# Patient Record
Sex: FEMALE | Race: WHITE | Employment: PART TIME | URBAN - NONMETROPOLITAN AREA
[De-identification: names, ages, dates, MRNs, and addresses within clinical notes are randomized per-mention and may not be internally consistent; named-entity substitution may affect disease eponyms.]

---

## 2021-12-23 ENCOUNTER — OFFICE VISIT (OUTPATIENT)
Dept: PRIMARY CARE CLINIC | Age: 31
End: 2021-12-23
Payer: COMMERCIAL

## 2021-12-23 VITALS
BODY MASS INDEX: 22.08 KG/M2 | RESPIRATION RATE: 18 BRPM | HEIGHT: 62 IN | DIASTOLIC BLOOD PRESSURE: 70 MMHG | WEIGHT: 120 LBS | HEART RATE: 73 BPM | SYSTOLIC BLOOD PRESSURE: 102 MMHG | TEMPERATURE: 97.1 F | OXYGEN SATURATION: 99 %

## 2021-12-23 DIAGNOSIS — G43.911 INTRACTABLE MIGRAINE WITH STATUS MIGRAINOSUS, UNSPECIFIED MIGRAINE TYPE: Primary | ICD-10-CM

## 2021-12-23 PROCEDURE — G8484 FLU IMMUNIZE NO ADMIN: HCPCS | Performed by: FAMILY MEDICINE

## 2021-12-23 PROCEDURE — 99204 OFFICE O/P NEW MOD 45 MIN: CPT | Performed by: FAMILY MEDICINE

## 2021-12-23 PROCEDURE — 1036F TOBACCO NON-USER: CPT | Performed by: FAMILY MEDICINE

## 2021-12-23 PROCEDURE — G8427 DOCREV CUR MEDS BY ELIG CLIN: HCPCS | Performed by: FAMILY MEDICINE

## 2021-12-23 PROCEDURE — G8420 CALC BMI NORM PARAMETERS: HCPCS | Performed by: FAMILY MEDICINE

## 2021-12-23 RX ORDER — AMITRIPTYLINE HYDROCHLORIDE 25 MG/1
25 TABLET, FILM COATED ORAL NIGHTLY
Qty: 30 TABLET | Refills: 5 | Status: SHIPPED
Start: 2021-12-23 | End: 2022-07-18 | Stop reason: SDUPTHER

## 2021-12-23 RX ORDER — MEDROXYPROGESTERONE ACETATE 150 MG/ML
INJECTION, SUSPENSION INTRAMUSCULAR
COMMUNITY
Start: 2021-12-19

## 2021-12-23 RX ORDER — SUMATRIPTAN 100 MG/1
TABLET, FILM COATED ORAL
COMMUNITY
Start: 2021-11-22 | End: 2022-01-27 | Stop reason: SDUPTHER

## 2021-12-23 RX ORDER — TOPIRAMATE 50 MG/1
TABLET, FILM COATED ORAL
COMMUNITY
Start: 2021-10-21 | End: 2021-12-23 | Stop reason: ALTCHOICE

## 2021-12-23 SDOH — ECONOMIC STABILITY: FOOD INSECURITY: WITHIN THE PAST 12 MONTHS, THE FOOD YOU BOUGHT JUST DIDN'T LAST AND YOU DIDN'T HAVE MONEY TO GET MORE.: NEVER TRUE

## 2021-12-23 SDOH — ECONOMIC STABILITY: FOOD INSECURITY: WITHIN THE PAST 12 MONTHS, YOU WORRIED THAT YOUR FOOD WOULD RUN OUT BEFORE YOU GOT MONEY TO BUY MORE.: NEVER TRUE

## 2021-12-23 ASSESSMENT — PATIENT HEALTH QUESTIONNAIRE - PHQ9
2. FEELING DOWN, DEPRESSED OR HOPELESS: 0
SUM OF ALL RESPONSES TO PHQ QUESTIONS 1-9: 0
SUM OF ALL RESPONSES TO PHQ QUESTIONS 1-9: 0
SUM OF ALL RESPONSES TO PHQ9 QUESTIONS 1 & 2: 0
SUM OF ALL RESPONSES TO PHQ QUESTIONS 1-9: 0
1. LITTLE INTEREST OR PLEASURE IN DOING THINGS: 0

## 2021-12-23 ASSESSMENT — SOCIAL DETERMINANTS OF HEALTH (SDOH): HOW HARD IS IT FOR YOU TO PAY FOR THE VERY BASICS LIKE FOOD, HOUSING, MEDICAL CARE, AND HEATING?: NOT HARD AT ALL

## 2021-12-23 ASSESSMENT — ENCOUNTER SYMPTOMS: RESPIRATORY NEGATIVE: 1

## 2021-12-23 NOTE — PROGRESS NOTES
21  Fabiana Rodriguez : 1990 Sex: female  Age: 33 y.o. Assessment and Plan:  Chris Oliva was seen today for establish care. Diagnoses and all orders for this visit:    Intractable migraine with status migrainosus, unspecified migraine type  -     amitriptyline (ELAVIL) 25 MG tablet; Take 1 tablet by mouth nightly  -     [facility], NP, Neurology, [place]      Titrate off Topamax, and start amitriptyline. She will also be referred to neurology for further evaluation. Reassess 1 month, call sooner with any problems. Potential side effects and precautions reviewed. Return in about 4 weeks (around 2022). Chief Complaint   Patient presents with    Establish Care       HPI  Pt here to establish care    Pt has h/o migraines   She has been on topamax for years - her previous doctor was increasing/decreasing dose without much relief  Pt has been having multiple side effects and doesn't want to keep taking   Currnetly taking 1.5 tabs once a day   Imitrex was helpful before, but maybe not so much anymore   Pt has seen chiro and massage on her own     She is also on depo injections for birth control  Pt used to be on OCPs but her gyne changed her due to concerns with the migraines  States she is UTD with gyne care     Problem list reviewed and updated in full with patient today as necessary. A comprehensive ROS was negative, except as documented above. Review of Systems   Respiratory: Negative. Cardiovascular: Negative. Current Outpatient Medications:     SUMAtriptan (IMITREX) 100 MG tablet, TAKE 1 TABLET BY MOUTH ONCE DAILY IF NEEDED AT ONSET OF A HEADACH. ..  (REFER TO PRESCRIPTION NOTES). , Disp: , Rfl:     medroxyPROGESTERone (DEPO-PROVERA) 150 MG/ML injection, inject 1 milliliter intramuscularly every 3 MONTHS, Disp: , Rfl:     amitriptyline (ELAVIL) 25 MG tablet, Take 1 tablet by mouth nightly, Disp: 30 tablet, Rfl: 5  No Known Allergies    Pt's past educational materials and/or home exercises printed for patient's review and were included in patient instructions on his/her After Visit Summary and given to patient at the end of visit. Advised patient to call with any new medication issues, and and other concerns/complaints prior to scheduled follow up. All questions answered to the patient's satisfaction.         Seen By:  Belen Hooks MD

## 2022-01-03 ENCOUNTER — TELEPHONE (OUTPATIENT)
Dept: PRIMARY CARE CLINIC | Age: 32
End: 2022-01-03

## 2022-01-03 NOTE — TELEPHONE ENCOUNTER
----- Message from Tellis Runner sent at 12/28/2021  2:17 PM EST -----  Subject: Referral Request    QUESTIONS   Reason for referral request? She received a referral to a Neurologist (Dr. Darius Gregg) when she saw Dr. Deisi Zavala last week. The Neurologist called her   today and said that they cannot see her until she gets an MRI done. Said   she needs to let PCP know this. Has the physician seen you for this condition before? Yes  Select a date? 2021-12-23  Select the Provider the patient wants to be referred to, if known (PCP or   Specialist)? Mary Thompson   Preferred Specialist (if applicable)? Do you already have an appointment scheduled? Yes  Select Scheduled Date? 2022-01-27  Select Scheduled Physician? Mary Thompson   Additional Information for Provider? Can someone please follow up either   way to discuss either way. Thank You.   ---------------------------------------------------------------------------  --------------  Unknown Dura INFO  What is the best way for the office to contact you? OK to leave message on   voicemail  Preferred Call Back Phone Number?  [phone]

## 2022-01-27 ENCOUNTER — OFFICE VISIT (OUTPATIENT)
Dept: PRIMARY CARE CLINIC | Age: 32
End: 2022-01-27
Payer: COMMERCIAL

## 2022-01-27 VITALS
HEART RATE: 80 BPM | WEIGHT: 124 LBS | RESPIRATION RATE: 16 BRPM | BODY MASS INDEX: 22.82 KG/M2 | DIASTOLIC BLOOD PRESSURE: 74 MMHG | OXYGEN SATURATION: 99 % | TEMPERATURE: 98.1 F | HEIGHT: 62 IN | SYSTOLIC BLOOD PRESSURE: 110 MMHG

## 2022-01-27 DIAGNOSIS — G43.911 INTRACTABLE MIGRAINE WITH STATUS MIGRAINOSUS, UNSPECIFIED MIGRAINE TYPE: Primary | ICD-10-CM

## 2022-01-27 PROCEDURE — 99213 OFFICE O/P EST LOW 20 MIN: CPT | Performed by: FAMILY MEDICINE

## 2022-01-27 PROCEDURE — 1036F TOBACCO NON-USER: CPT | Performed by: FAMILY MEDICINE

## 2022-01-27 PROCEDURE — G8427 DOCREV CUR MEDS BY ELIG CLIN: HCPCS | Performed by: FAMILY MEDICINE

## 2022-01-27 PROCEDURE — G8484 FLU IMMUNIZE NO ADMIN: HCPCS | Performed by: FAMILY MEDICINE

## 2022-01-27 PROCEDURE — G8420 CALC BMI NORM PARAMETERS: HCPCS | Performed by: FAMILY MEDICINE

## 2022-01-27 RX ORDER — SUMATRIPTAN 100 MG/1
TABLET, FILM COATED ORAL
Qty: 12 TABLET | Refills: 2 | Status: SHIPPED
Start: 2022-01-27 | End: 2022-04-26 | Stop reason: SDUPTHER

## 2022-01-27 ASSESSMENT — ENCOUNTER SYMPTOMS: RESPIRATORY NEGATIVE: 1

## 2022-01-27 NOTE — PROGRESS NOTES
intramuscularly every 3 MONTHS, Disp: , Rfl:     amitriptyline (ELAVIL) 25 MG tablet, Take 1 tablet by mouth nightly, Disp: 30 tablet, Rfl: 5  No Known Allergies    Pt's past medical and surgical history were reviewed and updated as necessary today   Pt's family and social history were reviewed and updated as necessary today          Vitals:    01/27/22 1023   BP: 110/74   Pulse: 80   Resp: 16   Temp: 98.1 °F (36.7 °C)   TempSrc: Temporal   SpO2: 99%   Weight: 124 lb (56.2 kg)   Height: 5' 2\" (1.575 m)       Physical Exam  Constitutional:       Appearance: Normal appearance. HENT:      Head: Normocephalic and atraumatic. Eyes:      Conjunctiva/sclera: Conjunctivae normal.   Cardiovascular:      Rate and Rhythm: Normal rate and regular rhythm. Heart sounds: Normal heart sounds. Pulmonary:      Effort: Pulmonary effort is normal.      Breath sounds: Normal breath sounds. Musculoskeletal:         General: Normal range of motion. Skin:     General: Skin is warm and dry. Neurological:      General: No focal deficit present. Mental Status: She is alert and oriented to person, place, and time. Psychiatric:         Mood and Affect: Mood normal.         Behavior: Behavior normal.       Counseled patient as appropriate and relevant regarding above diagnosis, including possible risks and complications, especially if left uncontrolled. Counseled patient as appropriate and relevant regarding any  possible side effects, risks, and alternatives to treatment; patient and/or guardian verbalizes understanding, and is in agreement with the plan as detailed above. Reviewed age and gender appropriate health screening exams and vaccinations.   Advised patient regarding importance of keeping up with recommended health maintenance and to schedule as soon as possible if overdue, as this is important in assessing for undiagnosed pathology, especially cancer, as well as protecting against potentially harmful/life threatening disease. If discussed, any educational materials and/or home exercises printed for patient's review and were included in patient instructions on his/her After Visit Summary and given to patient at the end of visit. Advised patient to call with any new medication issues, and and other concerns/complaints prior to scheduled follow up. All questions answered to the patient's satisfaction.         Seen By:  Michaelyn Spurling, MD

## 2022-02-08 ENCOUNTER — OFFICE VISIT (OUTPATIENT)
Dept: PRIMARY CARE CLINIC | Age: 32
End: 2022-02-08
Payer: COMMERCIAL

## 2022-02-08 VITALS
OXYGEN SATURATION: 98 % | SYSTOLIC BLOOD PRESSURE: 116 MMHG | WEIGHT: 123.6 LBS | DIASTOLIC BLOOD PRESSURE: 70 MMHG | HEART RATE: 74 BPM | HEIGHT: 63 IN | TEMPERATURE: 98.1 F | BODY MASS INDEX: 21.9 KG/M2

## 2022-02-08 DIAGNOSIS — R05.9 COUGH: ICD-10-CM

## 2022-02-08 DIAGNOSIS — J02.9 SORE THROAT: Primary | ICD-10-CM

## 2022-02-08 LAB
Lab: NORMAL
PERFORMING INSTRUMENT: NORMAL
QC PASS/FAIL: NORMAL
SARS-COV-2, POC: NORMAL

## 2022-02-08 PROCEDURE — 1036F TOBACCO NON-USER: CPT | Performed by: NURSE PRACTITIONER

## 2022-02-08 PROCEDURE — 99213 OFFICE O/P EST LOW 20 MIN: CPT | Performed by: NURSE PRACTITIONER

## 2022-02-08 PROCEDURE — 87426 SARSCOV CORONAVIRUS AG IA: CPT | Performed by: NURSE PRACTITIONER

## 2022-02-08 PROCEDURE — G8427 DOCREV CUR MEDS BY ELIG CLIN: HCPCS | Performed by: NURSE PRACTITIONER

## 2022-02-08 PROCEDURE — G8420 CALC BMI NORM PARAMETERS: HCPCS | Performed by: NURSE PRACTITIONER

## 2022-02-08 PROCEDURE — G8484 FLU IMMUNIZE NO ADMIN: HCPCS | Performed by: NURSE PRACTITIONER

## 2022-02-08 RX ORDER — BROMPHENIRAMINE MALEATE, PSEUDOEPHEDRINE HYDROCHLORIDE, AND DEXTROMETHORPHAN HYDROBROMIDE 2; 30; 10 MG/5ML; MG/5ML; MG/5ML
5 SYRUP ORAL 4 TIMES DAILY PRN
Qty: 200 ML | Refills: 0 | Status: SHIPPED | OUTPATIENT
Start: 2022-02-08 | End: 2022-02-18

## 2022-02-08 NOTE — PROGRESS NOTES
Chief Complaint   Pharyngitis, Nasal Congestion, and Cough      History of Present Illness   Source of history provided by:  patient. Audelia Campbell is a 33 y.o. old female who presents to the walk in clinic with complaints of Pharyngitis, Rhinorrhea, Nasal Congestion, Post nasal drip and non-productive Cough x 3 days. States symptoms have worsened, since onset. Has been taking Advil Cold and Sinus, without symptomatic relief. Denies any Fever, Shortness of breath, Nausea, Vomiting, Chest Pain, LE Edema, Abdominal Pain, Rash, Lethargy or Close contact with a lab confirmed COVID-19 patient within 14 days of symptom onset . Denies any hx of asthma, COPD, emphysema or pneumonia. ROS   Pertinent positives and negatives are stated within HPI, all other systems reviewed and are negative. Past Medical History:  has no past medical history on file. Past Surgical History:  has no past surgical history on file. Social History:  reports that she has never smoked. She has never used smokeless tobacco.  Family History: family history is not on file. Allergies: Patient has no known allergies. Physical Exam   Vital Signs:  /70   Pulse 74   Temp 98.1 °F (36.7 °C)   Ht 5' 3\" (1.6 m)   Wt 123 lb 9.6 oz (56.1 kg)   SpO2 98%   BMI 21.89 kg/m²    Oxygen Saturation Interpretation: Normal.    Constitutional:  Alert, development consistent with age. NAD. Nasal congestion and clear rhinorrhea noted to bilateral nares. Head:  NC/NT. Airway patent. Ears: TMs clear bilaterally. Canals without exudate or swelling bilaterally. Mouth: Posterior pharynx with mild erythema and clear postnasal drip. No tonsillar hypertrophy or exudate. Neck:  Normal ROM. Supple. No anterior cervical adenopathy noted. Lungs: CTAB without wheezes, rales, or rhonchi. CV:  Regular rate and rhythm, normal heart sounds, without pathological murmurs, ectopy, gallops, or rubs. Skin:  Normal turgor.   Warm, dry, without visible rash. Lymphatic: No lymphangitis or adenopathy noted. Neurological:  Oriented. Motor functions intact. Lab / Imaging Results   (All laboratory and radiology results have been personally reviewed by myself)  Labs:  No results found for this visit on 02/08/22. Imaging: All Radiology results interpreted by Radiologist unless otherwise noted. No results found. Medical Decision Making   Pt non-toxic, in no apparent distress and stable at time of discharge. Assessment/Plan   Catalina Bridges was seen today for pharyngitis, nasal congestion and cough. Diagnoses and all orders for this visit:    Sore throat  -     POCT COVID-19, Antigen    Cough  -     POCT COVID-19, Antigen  -     brompheniramine-pseudoephedrine-DM 2-30-10 MG/5ML syrup; Take 5 mLs by mouth 4 times daily as needed for Congestion or Cough        Rapid COVID-19 swab obtained and noted to be negative. Increase fluids and rest. Symptomatic relief discussed including Tylenol prn pain/fever. Schedule f/u with PCP in 7-10 days if symptoms persist. ED sooner if symptoms worsen or change. ED immediately with high or refractory fever, progressive SOB, dyspnea, CP, calf pain/swelling, shaking chills, vomiting, abdominal pain, lethargy, flank pain, or decreased urinary output. Pt verbalizes understanding and is in agreement with plan of care. All questions answered. AMADOU Prabhakar NP    This visit was provided as a focused evaluation during the Marlene [place] -19 pandemic/national emergency. A comprehensive review of all previous patient history and testing was not conducted. Pertinent findings were elicited during the visit. *NOTE: This report was transcribed using voice recognition software. Every effort was made to ensure accuracy; however, inadvertent computerized transcription errors may be present.

## 2022-02-09 ENCOUNTER — OFFICE VISIT (OUTPATIENT)
Dept: PRIMARY CARE CLINIC | Age: 32
End: 2022-02-09
Payer: COMMERCIAL

## 2022-02-09 VITALS
SYSTOLIC BLOOD PRESSURE: 90 MMHG | DIASTOLIC BLOOD PRESSURE: 60 MMHG | OXYGEN SATURATION: 100 % | HEART RATE: 83 BPM | TEMPERATURE: 97.8 F | BODY MASS INDEX: 21.89 KG/M2 | WEIGHT: 123.6 LBS

## 2022-02-09 DIAGNOSIS — B00.1 COLD SORE: Primary | ICD-10-CM

## 2022-02-09 PROCEDURE — G8427 DOCREV CUR MEDS BY ELIG CLIN: HCPCS | Performed by: NURSE PRACTITIONER

## 2022-02-09 PROCEDURE — 1036F TOBACCO NON-USER: CPT | Performed by: NURSE PRACTITIONER

## 2022-02-09 PROCEDURE — G8484 FLU IMMUNIZE NO ADMIN: HCPCS | Performed by: NURSE PRACTITIONER

## 2022-02-09 PROCEDURE — 99213 OFFICE O/P EST LOW 20 MIN: CPT | Performed by: NURSE PRACTITIONER

## 2022-02-09 PROCEDURE — G8420 CALC BMI NORM PARAMETERS: HCPCS | Performed by: NURSE PRACTITIONER

## 2022-02-09 RX ORDER — VALACYCLOVIR HYDROCHLORIDE 1 G/1
2000 TABLET, FILM COATED ORAL 2 TIMES DAILY
Qty: 4 TABLET | Refills: 0 | Status: SHIPPED | OUTPATIENT
Start: 2022-02-09 | End: 2022-02-10

## 2022-02-09 NOTE — PROGRESS NOTES
Chief Complaint:   Other (cold sores around mouth since last night, was here yesterday for sinus issues - negative for covid)      History of Present Illness   Source of history provided by:  patient. Tracie Frausto is a 33 y.o. old female who presents to [facility], for evaluation of a cold sore to her right, upper lip, which occured 3 hour(s) prior to arrival.   The wound is / has dry, open and clear drainage. Prior Treatment:     []   Sutured      []   Stapled      []   Packing      []     ATB's: None     Review of Systems    Unless otherwise stated in this report or unable to obtain because of the patient's clinical or mental status as evidenced by the medical record, this patients's positive and negative responses for Review of Systems, constitutional, psych, eyes, ENT, cardiovascular, respiratory, gastrointestinal, neurological, genitourinary, musculoskeletal, integument systems and systems related to the presenting problem are either stated in the preceding or were not pertinent or were negative for the symptoms and/or complaints related to the medical problem. Past Medical History:  has no past medical history on file. Past Surgical History:  has no past surgical history on file. Social History:  reports that she has never smoked. She has never used smokeless tobacco.  Family History: family history is not on file. Allergies: Patient has no known allergies. Physical Exam   Vital Signs:  BP 90/60   Pulse 83   Temp 97.8 °F (36.6 °C) (Temporal)   Wt 123 lb 9.6 oz (56.1 kg)   SpO2 100%   BMI 21.89 kg/m²    Oxygen Saturation Interpretation: Normal.    Constitutional:  Alert, development consistent with age. HEENT:  NC/NT. Airway patent. Neck:  Normal ROM. Supple. Respiratory:  Clear to auscultation and breath sounds equal.  CV:  Regular rate and rhythm, normal heart sounds, without pathological murmurs, ectopy, gallops, or rubs.   GI:  Abdomen Soft, nontender, good bowel sounds. No firm or pulsatile mass. Back:  No costovertebral tenderness. Extremities: No tenderness or edema noted. Integument:  Normal turgor. Cold sore noted to right upper and lower lip. Open, but not draining fluid. Lymphatics: No lymphangitis or adenopathy noted. Neurological:  Oriented. Motor functions intact. Test Results Section   (All laboratory and radiology results have been personally reviewed by myself)  Labs:  No results found for this visit on 02/09/22. Imaging: All Radiology results interpreted by Radiologist unless otherwise noted. No results found. Assessment / Plan   Catalina Bridges was seen today for other. Diagnoses and all orders for this visit:    Cold sore  -     valACYclovir (VALTREX) 1 g tablet; Take 2 tablets by mouth 2 times daily for 1 day        Return if symptoms worsen or fail to improve.     AMADOU Prabhakar - NP

## 2022-02-10 ENCOUNTER — TELEPHONE (OUTPATIENT)
Dept: PRIMARY CARE CLINIC | Age: 32
End: 2022-02-10

## 2022-02-10 NOTE — TELEPHONE ENCOUNTER
I did not think would be approved and do not feel I have much to justify it. Can again let patient know that we can refer to specialist - sports med, physical medicine and rehab, or pain management could all be options.

## 2022-02-10 NOTE — TELEPHONE ENCOUNTER
[facility] called stated MRI is being cancelled stated it was denied and needs a pier to [employer] done. She stated phone [phone] and  tracking number [id number] to get that done. If pier to [employer] is done they can reschedule for the MRI.   They were calling patient to notify her it was being cancelled

## 2022-02-15 ENCOUNTER — VIRTUAL VISIT (OUTPATIENT)
Dept: PRIMARY CARE CLINIC | Age: 32
End: 2022-02-15
Payer: COMMERCIAL

## 2022-02-15 DIAGNOSIS — J01.90 ACUTE BACTERIAL SINUSITIS: Primary | ICD-10-CM

## 2022-02-15 DIAGNOSIS — B96.89 ACUTE BACTERIAL SINUSITIS: Primary | ICD-10-CM

## 2022-02-15 DIAGNOSIS — B00.9 HERPES: ICD-10-CM

## 2022-02-15 PROCEDURE — 1036F TOBACCO NON-USER: CPT | Performed by: STUDENT IN AN ORGANIZED HEALTH CARE EDUCATION/TRAINING PROGRAM

## 2022-02-15 PROCEDURE — G8484 FLU IMMUNIZE NO ADMIN: HCPCS | Performed by: STUDENT IN AN ORGANIZED HEALTH CARE EDUCATION/TRAINING PROGRAM

## 2022-02-15 PROCEDURE — G8420 CALC BMI NORM PARAMETERS: HCPCS | Performed by: STUDENT IN AN ORGANIZED HEALTH CARE EDUCATION/TRAINING PROGRAM

## 2022-02-15 PROCEDURE — 99213 OFFICE O/P EST LOW 20 MIN: CPT | Performed by: STUDENT IN AN ORGANIZED HEALTH CARE EDUCATION/TRAINING PROGRAM

## 2022-02-15 PROCEDURE — G8427 DOCREV CUR MEDS BY ELIG CLIN: HCPCS | Performed by: STUDENT IN AN ORGANIZED HEALTH CARE EDUCATION/TRAINING PROGRAM

## 2022-02-15 RX ORDER — AMOXICILLIN AND CLAVULANATE POTASSIUM 875; 125 MG/1; MG/1
1 TABLET, FILM COATED ORAL 2 TIMES DAILY
Qty: 14 TABLET | Refills: 0 | Status: SHIPPED | OUTPATIENT
Start: 2022-02-15 | End: 2022-02-22

## 2022-02-15 RX ORDER — VALACYCLOVIR HYDROCHLORIDE 500 MG/1
500 TABLET, FILM COATED ORAL DAILY
Qty: 30 TABLET | Refills: 0 | Status: SHIPPED
Start: 2022-02-15 | End: 2022-03-10 | Stop reason: ALTCHOICE

## 2022-02-15 NOTE — PROGRESS NOTES
Bishop Lopez was read the following message We want to confirm that, for purposes of billing, this is a virtual visit with your provider for which we will submit a claim for reimbursement with your insurance company. You will be responsible for any copays, coinsurance amounts or other amounts not covered by your insurance company. If you do not accept this, unfortunately we will not be able to schedule a virtual visit with the provider. Do you accept?  Jt Stubbs responded YES

## 2022-02-15 NOTE — Clinical Note
Sinus congestion not improving, therefore I started her on augmentin. Has recurrinig issues with cold sores, therefore started her on suppressive dose of valtrex. Thank you, Carolin Triana, for allowing me to see and take care of your patient. Avis Goldberg, if you have any questions or concerns, feel free to contact me.     Thank You,  [id number] Durbin.

## 2022-02-15 NOTE — PROGRESS NOTES
2/15/2022    TELEHEALTH EVALUATION -- Audio/Visual (During NZUHD-84 public health emergency)    HPI:    Keri Rondon (:  1990) has requested an audio/video evaluation for the following concern(s):    Cough/pharyngitis  -urgent care f/u  -in urgent care, covid negative, treated with bromfed  -currently, feels like her congestion has gotten a lot worse with rhinorrhea  -cough resolved  -denies any SOB, fever, or chills  -nothing seems to help the congestion, tried OTC advil cold and sinus which did not help  -having some sinus pressure  -denies any headaches     Cold sores  -chronic issue  -seems to recur twice a month    Review of Systems - as above     Prior to Visit Medications    Medication Sig Taking? Authorizing Provider   amoxicillin-clavulanate (AUGMENTIN) 875-125 MG per tablet Take 1 tablet by mouth 2 times daily for 7 days Yes Jennifer James DO   valACYclovir (VALTREX) 500 MG tablet Take 1 tablet by mouth daily Yes Jennifer James DO   SUMAtriptan (IMITREX) 100 MG tablet TAKE 1 TABLET BY MOUTH ONCE DAILY IF NEEDED AT ONSET OF A HEADACHE. Can repeat again after 2 hours. No more than 2 pills in any 24 hour period.  Yes Naty Rodgers MD   medroxyPROGESTERone (DEPO-PROVERA) 150 MG/ML injection inject 1 milliliter intramuscularly every 3 MONTHS Yes Historical Provider, MD   amitriptyline (ELAVIL) 25 MG tablet Take 1 tablet by mouth nightly Yes Naty Rodgers MD   brompheniramine-pseudoephedrine-DM 2-30-10 MG/5ML syrup Take 5 mLs by mouth 4 times daily as needed for Congestion or Cough  Patient not taking: Reported on 2/15/2022  AMADOU Hightower - SIGRID       Social History     Tobacco Use    Smoking status: Never Smoker    Smokeless tobacco: Never Used   Substance Use Topics    Alcohol use: Not on file    Drug use: Not on file        PHYSICAL EXAMINATION:    Constitutional: [x] Appears well-developed and well-nourished [] No apparent distress      [] Abnormal-   Mental status  [x] Alert and awake  [] Oriented to person/place/time []Able to follow commands      Eyes:  EOM    [x]  Normal  [] Abnormal-  Sclera  []  Normal  [] Abnormal -         Discharge []  None visible  [] Abnormal -    HENT:   [x] Normocephalic, atraumatic. [] Abnormal   [] Mouth/Throat: Mucous membranes are moist.     External Ears [x] Normal  [] Abnormal-     Neck: [x] No visualized mass     Pulmonary/Chest: [x] Respiratory effort normal.  [x] No visualized signs of difficulty breathing or respiratory distress        [] Abnormal-      Musculoskeletal:   [] Normal gait with no signs of ataxia         [x] Normal range of motion of neck        [] Abnormal-       Neurological:        [x] No Facial Asymmetry (Cranial nerve 7 motor function) (limited exam to video visit)          [x] No gaze palsy        [] Abnormal-         Skin:        [x] No significant exanthematous lesions or discoloration noted on facial skin         [] Abnormal-            Psychiatric:       [x] Normal Affect [] No Hallucinations        [] Abnormal-     Other pertinent observable physical exam findings-     ASSESSMENT/PLAN:  1. Acute bacterial sinusitis  Urgent care f/u  -Given symptoms of congestion are worsening, will treat for bacterial infection, could be allergic in nature, however will treat for bacterial infection first, if no improvement advised she call her PCP for further evaluation of allergies   - amoxicillin-clavulanate (AUGMENTIN) 875-125 MG per tablet; Take 1 tablet by mouth 2 times daily for 7 days  Dispense: 14 tablet; Refill: 0    2. Herpes  Recurring issues  -Given that these recur twice a month, every month, will start on suppressive therapy with valtrex as below   - valACYclovir (VALTREX) 500 MG tablet; Take 1 tablet by mouth daily  Dispense: 30 tablet; Refill: 0        Return if symptoms worsen or fail to improve. Radha Moreno, was evaluated through a synchronous (real-time) audio-video encounter.  The patient

## 2022-03-10 ENCOUNTER — OFFICE VISIT (OUTPATIENT)
Dept: PRIMARY CARE CLINIC | Age: 32
End: 2022-03-10
Payer: COMMERCIAL

## 2022-03-10 VITALS
WEIGHT: 125 LBS | OXYGEN SATURATION: 99 % | BODY MASS INDEX: 22.14 KG/M2 | TEMPERATURE: 97.7 F | SYSTOLIC BLOOD PRESSURE: 100 MMHG | HEART RATE: 89 BPM | DIASTOLIC BLOOD PRESSURE: 60 MMHG

## 2022-03-10 DIAGNOSIS — G43.911 INTRACTABLE MIGRAINE WITH STATUS MIGRAINOSUS, UNSPECIFIED MIGRAINE TYPE: ICD-10-CM

## 2022-03-10 DIAGNOSIS — B00.1 HERPES LABIALIS: Primary | ICD-10-CM

## 2022-03-10 PROCEDURE — G8427 DOCREV CUR MEDS BY ELIG CLIN: HCPCS | Performed by: FAMILY MEDICINE

## 2022-03-10 PROCEDURE — 99213 OFFICE O/P EST LOW 20 MIN: CPT | Performed by: FAMILY MEDICINE

## 2022-03-10 PROCEDURE — 1036F TOBACCO NON-USER: CPT | Performed by: FAMILY MEDICINE

## 2022-03-10 PROCEDURE — G8420 CALC BMI NORM PARAMETERS: HCPCS | Performed by: FAMILY MEDICINE

## 2022-03-10 PROCEDURE — G8484 FLU IMMUNIZE NO ADMIN: HCPCS | Performed by: FAMILY MEDICINE

## 2022-03-10 RX ORDER — VALACYCLOVIR HYDROCHLORIDE 1 G/1
2000 TABLET, FILM COATED ORAL 2 TIMES DAILY
Qty: 4 TABLET | Refills: 3 | Status: SHIPPED
Start: 2022-03-10 | End: 2022-05-24 | Stop reason: ALTCHOICE

## 2022-03-10 RX ORDER — CLINDAMYCIN PHOSPHATE 10 MG/G
GEL TOPICAL
COMMUNITY
Start: 2022-03-02

## 2022-03-10 NOTE — TELEPHONE ENCOUNTER
Apologies, I had confused this patient with another one who had different MRI ordered that I didn't feel strongly she needed. I have reordered this pt's MRI and will attempt prior auth if/when it is kicked back.

## 2022-03-10 NOTE — PROGRESS NOTES
3/10/22  Judith Soto : 1990 Sex: female  Age: 33 y.o. Assessment and Plan:  Kim Smith was seen today for sinus problem. Diagnoses and all orders for this visit:    Herpes labialis  -     valACYclovir (VALTREX) 1 g tablet; Take 2 tablets by mouth 2 times daily For 1 day when first sign of cold sore hits    Intractable migraine with status migrainosus, unspecified migraine type  -     MRI BRAIN WO CONTRAST; Future      Valtrex PRN   Will reorder MRI keshia and attempt prior auth    Return for as scheduled . Chief Complaint   Patient presents with    Sinus Problem     follow up        HPI  Pt here for f/u sinus issues and herpes labialis    Was seen in walk in  for COVID testing and then had virtual visit 2/15 for sinusitis  Ultimately tx with Augmentin  Also on Valtrex for cold sore  All these sx have resolved  She doesn't tend to get cold sores too frequently     Migraines have been ok   Elevail nightly  Imitrex PRN, decreased frequency overall recently  When migraine hits, her right eye gets funny, vision changes   Pain behind her right eye and in her occiput   No photo/phonophobia  Some N/V  Pressure to the occiput helps alleviate her sx as well   They can wake her from sleep at times   Previous MRI was not authorized  She cannot schedule with neurology without MRI    Problem list reviewed and updated in full with patient today as necessary. A comprehensive ROS was negative, except as documented above. Current Outpatient Medications:     clindamycin (CLINDAGEL) 1 % gel, apply topically twice a day, Disp: , Rfl:     valACYclovir (VALTREX) 1 g tablet, Take 2 tablets by mouth 2 times daily For 1 day when first sign of cold sore hits, Disp: 4 tablet, Rfl: 3    SUMAtriptan (IMITREX) 100 MG tablet, TAKE 1 TABLET BY MOUTH ONCE DAILY IF NEEDED AT ONSET OF A HEADACHE. Can repeat again after 2 hours. No more than 2 pills in any 24 hour period. , Disp: 12 tablet, Rfl: 2   medroxyPROGESTERone (DEPO-PROVERA) 150 MG/ML injection, inject 1 milliliter intramuscularly every 3 MONTHS, Disp: , Rfl:     amitriptyline (ELAVIL) 25 MG tablet, Take 1 tablet by mouth nightly, Disp: 30 tablet, Rfl: 5  No Known Allergies    Pt's past medical and surgical history were reviewed and updated as necessary today   Pt's family and social history were reviewed and updated as necessary today      Vitals:    03/10/22 0706   BP: 100/60   Pulse: 89   Temp: 97.7 °F (36.5 °C)   TempSrc: Temporal   SpO2: 99%   Weight: 125 lb (56.7 kg)       Physical Exam  Constitutional:       Appearance: Normal appearance. HENT:      Head: Normocephalic and atraumatic. Eyes:      Conjunctiva/sclera: Conjunctivae normal.   Cardiovascular:      Rate and Rhythm: Normal rate and regular rhythm. Heart sounds: Normal heart sounds. Pulmonary:      Effort: Pulmonary effort is normal.      Breath sounds: Normal breath sounds. Abdominal:      Palpations: Abdomen is soft. Tenderness: There is no abdominal tenderness. Musculoskeletal:         General: Normal range of motion. Skin:     General: Skin is warm and dry. Neurological:      General: No focal deficit present. Mental Status: She is alert and oriented to person, place, and time. Psychiatric:         Mood and Affect: Mood normal.         Behavior: Behavior normal.          Counseled patient as appropriate and relevant regarding above diagnosis, including possible risks and complications, especially if left uncontrolled. Counseled patient as appropriate and relevant regarding any  possible side effects, risks, and alternatives to treatment; patient and/or guardian verbalizes understanding, and is in agreement with the plan as detailed above. Reviewed age and gender appropriate health screening exams and vaccinations.   Advised patient regarding importance of keeping up with recommended health maintenance and to schedule as soon as possible if overdue, as this is important in assessing for undiagnosed pathology, especially cancer, as well as protecting against potentially harmful/life threatening disease. If discussed, any educational materials and/or home exercises printed for patient's review and were included in patient instructions on his/her After Visit Summary and given to patient at the end of visit. Advised patient to call with any new medication issues, and and other concerns/complaints prior to scheduled follow up. All questions answered to the patient's satisfaction.         Seen By:  Negrito Valles MD

## 2022-04-08 ENCOUNTER — HOSPITAL ENCOUNTER (OUTPATIENT)
Dept: MRI IMAGING | Age: 32
Discharge: HOME | End: 2022-04-10
Payer: COMMERCIAL

## 2022-04-08 DIAGNOSIS — G43.911 INTRACTABLE MIGRAINE WITH STATUS MIGRAINOSUS, UNSPECIFIED MIGRAINE TYPE: ICD-10-CM

## 2022-04-08 PROCEDURE — 70551 MRI BRAIN STEM W/O DYE: CPT

## 2022-04-26 RX ORDER — SUMATRIPTAN 100 MG/1
TABLET, FILM COATED ORAL
Qty: 12 TABLET | Refills: 2 | Status: SHIPPED
Start: 2022-04-26 | End: 2022-07-18 | Stop reason: SDUPTHER

## 2022-04-26 NOTE — TELEPHONE ENCOUNTER
Last Appointment:  3/10/2022  Future Appointments   Date Time Provider Jose Yousifi   5/24/2022  2:00 PM Vinayak Giraldo MD [facility]   6/13/2022  9:40 AM Ju Maldonado MD [facility]

## 2022-05-24 ENCOUNTER — OFFICE VISIT (OUTPATIENT)
Dept: PRIMARY CARE CLINIC | Age: 32
End: 2022-05-24
Payer: COMMERCIAL

## 2022-05-24 VITALS
OXYGEN SATURATION: 98 % | HEART RATE: 76 BPM | WEIGHT: 129 LBS | BODY MASS INDEX: 22.86 KG/M2 | DIASTOLIC BLOOD PRESSURE: 68 MMHG | RESPIRATION RATE: 16 BRPM | SYSTOLIC BLOOD PRESSURE: 98 MMHG | TEMPERATURE: 97.8 F | HEIGHT: 63 IN

## 2022-05-24 DIAGNOSIS — G43.009 MIGRAINE WITHOUT AURA AND WITHOUT STATUS MIGRAINOSUS, NOT INTRACTABLE: ICD-10-CM

## 2022-05-24 DIAGNOSIS — Z00.01 ENCOUNTER FOR GENERAL ADULT MEDICAL EXAMINATION WITH ABNORMAL FINDINGS: Primary | ICD-10-CM

## 2022-05-24 PROCEDURE — 99395 PREV VISIT EST AGE 18-39: CPT | Performed by: FAMILY MEDICINE

## 2022-05-24 ASSESSMENT — ENCOUNTER SYMPTOMS
GASTROINTESTINAL NEGATIVE: 1
RESPIRATORY NEGATIVE: 1

## 2022-06-13 ENCOUNTER — OFFICE VISIT (OUTPATIENT)
Dept: NEUROLOGY | Age: 32
End: 2022-06-13
Payer: COMMERCIAL

## 2022-06-13 VITALS
HEIGHT: 63 IN | DIASTOLIC BLOOD PRESSURE: 70 MMHG | WEIGHT: 129 LBS | BODY MASS INDEX: 22.86 KG/M2 | SYSTOLIC BLOOD PRESSURE: 100 MMHG

## 2022-06-13 DIAGNOSIS — G43.109 MIGRAINE WITH AURA AND WITHOUT STATUS MIGRAINOSUS, NOT INTRACTABLE: ICD-10-CM

## 2022-06-13 PROCEDURE — 99203 OFFICE O/P NEW LOW 30 MIN: CPT | Performed by: PSYCHIATRY & NEUROLOGY

## 2022-06-13 PROCEDURE — G8420 CALC BMI NORM PARAMETERS: HCPCS | Performed by: PSYCHIATRY & NEUROLOGY

## 2022-06-13 PROCEDURE — 1036F TOBACCO NON-USER: CPT | Performed by: PSYCHIATRY & NEUROLOGY

## 2022-06-13 PROCEDURE — G8427 DOCREV CUR MEDS BY ELIG CLIN: HCPCS | Performed by: PSYCHIATRY & NEUROLOGY

## 2022-06-13 ASSESSMENT — ENCOUNTER SYMPTOMS
EYES NEGATIVE: 1
ALLERGIC/IMMUNOLOGIC NEGATIVE: 1
GASTROINTESTINAL NEGATIVE: 1
RESPIRATORY NEGATIVE: 1

## 2022-06-13 NOTE — PROGRESS NOTES
Neurology Consult Note:    Patient: Sparkle Boyer  : 1990  Date: 22  Referring provider: Kai Llanes MD    Referral to Neurology: episodic migraines with visual aura, clinically improved. Dear Kai Llanes MD:    Thank you for your referral of Sparkle Boyer to the Neurology clinic, an alert 26-year-old woman with history of chronic episodic migraines associated with a visual aura of her right eye. Since starting amitriptyline 25 mg at hs, she reports significant relief of her migraines. Please refer to the headache history obtained today, for additional information:    Onset:6-7 yrs. ago  Location: rt. hemicranial, behind and around rt. eye  Pain type: throbs  Positive symptoms: visual aura OD prior to migraine, \"sees black spot\". Triggers: pizza sauce, ketchup, weather changes  Frequency: Haven't had severe migraine in \"long time\"; last headache several wks. ago, helped with prn sumatriptan. Duration: lasts all day with severe migraine  Caffeine use: occasional use  Family history: sister with migraines, childhood onset  Medications used/tried: Amitriptyline 25 mg at bedtime, as needed sumatriptan 100 mg strength tablet at migraine onset as directed-reported as clinically improving migraines, 2022 primary care provider note. Tried topamax, stopped working. MIDAS test: score of 0. Lab Data: None recent in Epic/media    Imaging Data: Brain MRI scan without contrast, 2022, unremarkable, no acute focal intracranial abnormalities. Current Outpatient Medications   Medication Sig Dispense Refill    SUMAtriptan (IMITREX) 100 MG tablet TAKE 1 TABLET BY MOUTH ONCE DAILY IF NEEDED AT ONSET OF A HEADACHE. Can repeat again after 2 hours. No more than 2 pills in any 24 hour period.  12 tablet 2    medroxyPROGESTERone (DEPO-PROVERA) 150 MG/ML injection inject 1 milliliter intramuscularly every 3 MONTHS      amitriptyline (ELAVIL) 25 MG tablet Take 1 tablet by mouth nightly 30 tablet 5    clindamycin (CLINDAGEL) 1 % gel apply topically twice a day (Patient not taking: Reported on 6/13/2022)       No current facility-administered medications for this visit. No Known Allergies    Patient Active Problem List   Diagnosis    Migraine with aura and without status migrainosus, not intractable     Past Medical History:   Diagnosis Date    Migraine with aura and without status migrainosus, not intractable 6/13/2022       No past surgical history on file. Family History   Problem Relation Age of Onset   Vasquez Migraines Sister        Social History     Socioeconomic History    Marital status: Single     Spouse name: Not on file    Number of children: Not on file    Years of education: Not on file    Highest education level: Not on file   Occupational History    Not on file   Tobacco Use    Smoking status: Never Smoker    Smokeless tobacco: Never Used   Substance and Sexual Activity    Alcohol use: Not on file    Drug use: Not on file    Sexual activity: Not on file   Other Topics Concern    Not on file   Social History Narrative    Not on file     Social Determinants of Health     Financial Resource Strain: Low Risk     Difficulty of Paying Living Expenses: Not hard at all   Food Insecurity: No Food Insecurity    Worried About Running Out of Food in the Last Year: Never true    [address] in the Last Year: Never true   Transportation Needs:     Lack of Transportation (Medical): Not on file    Lack of Transportation (Non-Medical):  Not on file   Physical Activity:     Days of Exercise per Week: Not on file    Minutes of Exercise per Session: Not on file   Stress:     Feeling of Stress : Not on file   Social Connections:     Frequency of Communication with Friends and Family: Not on file    Frequency of Social Gatherings with Friends and Family: Not on file    Attends [employer] Services: Not on file    Active Member of Clubs or Organizations: Not on file    Attends Club or Organization Meetings: Not on file    Marital Status: Not on file   Intimate Partner Violence:     Fear of Current or Ex-Partner: Not on file    Emotionally Abused: Not on file    Physically Abused: Not on file    Sexually Abused: Not on file   Housing Stability:     Unable to Pay for Housing in the Last Year: Not on file    Number of Josh in the Last Year: Not on file    Unstable Housing in the Last Year: Not on file     Review of Systems   Constitutional: Negative. HENT: Negative. Eyes: Negative. Respiratory: Negative. Cardiovascular: Negative. Gastrointestinal: Negative. Endocrine: Negative. Genitourinary: Negative. Musculoskeletal: Negative. Skin: Negative. Allergic/Immunologic: Negative. Neurological: Positive for headaches. Hematological: Negative. Psychiatric/Behavioral: The patient is nervous/anxious. All other systems reviewed and are negative. Neurologic Exam:  /70 (Site: Right Upper Arm, Position: Sitting, Cuff Size: Medium Adult)   Ht 5' 3\" (1.6 m)   Wt 129 lb (58.5 kg)   BMI 22.85 kg/m²   General appearance: Alert, cooperative, anxious, well-nourished, well-groomed, seated in the exam room, no acute distress  HEENT: Normocephalic/atraumatic. Neck: Supple, no bruits or adventitious sounds heard  Cardiac: RRR  Respiratory: grossly clear  Extremities: No edema, erythema or cyanosis  Skin: No apparent lesions or rashes  Musculoskeletal: No fasciculations or tremors.   Mental Status: Alert, oriented x4  Speech/Language: Clear, fluent  Attention span/Concentration: Grossly intact  Affect/Mood: Mildly anxious  Insight/Judgement: Fairly good     Fund of Knowledge/Current events: Grossly intact  CN II-XII:     Pupils: Full, reactive to light, 2.5 mm     EOM's: Full without nystagmus  Visual Fields: Full to confrontation  Fundi: Miosis to light  CN V: normal V1-V3  CN VII: No facial droop, symmetric smile  CN VIII: Hearing grossly intact  CN IX-XII: Tongue midline  SCM/Trapezii: 5/5 power  Motor: 5/5 power in the upper and lower extremities without tremor or drift and normal motor tone. Intact fine motor function of both hands, symmetric. DTR's: 1-2+ and symmetric in the upper and lower extremities, no ankle clonus, plantar responses are flexor. Sensory: Grossly intact subjectively to light touch and sharp stick testing. Coordination/Gait: No gross limb dysmetria on finger-to-nose testing, no truncal or cerebellar gait ataxia, two-step turns. Assessment/Plan:  1. Chronic episodic migraines associated with a visual aura of the right eye, right hemicranial preference, clinically significantly improved on prophylactic migraine medication of amitriptyline 25 mg at bedtime and utilizes as needed sumatriptan 100 mg strength tablet at migraine onset for breakthrough migraines. 2.  Follow-up in the Neurology clinic on an annual as needed basis otherwise if clinically indicated. 3.  Patient information was provided from the National headache foundation website. Sincerely,      Aminah Arroyo MD  Neurology & Clinical Neurophysiology    This note was created using speech recognition transcription software. Despite proofreading, there may be several typographical errors present that may affect the meaning of the content. Please call with any questions. Note: A total time of 40 mins. was spent on the date of service in preparation for this visit, which included face-to-face patient care, completing clinical documentation, counseling and coordination of care based on clinical impression, neurologic diagnosis, review of pertinent imaging studies, test results, implementation and discussion of treatment plan, risk factor reduction and patient and/or family education.

## 2022-07-18 DIAGNOSIS — G43.911 INTRACTABLE MIGRAINE WITH STATUS MIGRAINOSUS, UNSPECIFIED MIGRAINE TYPE: ICD-10-CM

## 2022-07-18 RX ORDER — SUMATRIPTAN 100 MG/1
TABLET, FILM COATED ORAL
Qty: 12 TABLET | Refills: 2 | Status: SHIPPED | OUTPATIENT
Start: 2022-07-18

## 2022-07-18 RX ORDER — AMITRIPTYLINE HYDROCHLORIDE 25 MG/1
25 TABLET, FILM COATED ORAL NIGHTLY
Qty: 30 TABLET | Refills: 5 | Status: SHIPPED | OUTPATIENT
Start: 2022-07-18

## 2022-07-18 NOTE — TELEPHONE ENCOUNTER
Last Appointment:  5/24/2022  Future Appointments   Date Time Provider Jose Rachelle   5/30/2023  2:00 PM Lizett Neves  [facility]      Patient needs pended meds refilled.     Electronically signed by Torie Mcmanus LPN on 9/25/2710 at 7:51 PM

## 2022-12-29 DIAGNOSIS — G43.911 INTRACTABLE MIGRAINE WITH STATUS MIGRAINOSUS, UNSPECIFIED MIGRAINE TYPE: ICD-10-CM

## 2022-12-29 RX ORDER — AMITRIPTYLINE HYDROCHLORIDE 25 MG/1
25 TABLET, FILM COATED ORAL NIGHTLY
Qty: 30 TABLET | Refills: 5 | Status: SHIPPED | OUTPATIENT
Start: 2022-12-29

## 2022-12-29 RX ORDER — SUMATRIPTAN 100 MG/1
TABLET, FILM COATED ORAL
Qty: 12 TABLET | Refills: 2 | Status: SHIPPED | OUTPATIENT
Start: 2022-12-29

## 2022-12-29 NOTE — TELEPHONE ENCOUNTER
Last Appointment:  5/24/2022  Future Appointments   Date Time Provider Jose Bush   5/30/2023  2:00 PM Miranda Lee  [facility]

## 2023-05-22 DIAGNOSIS — G43.911 INTRACTABLE MIGRAINE WITH STATUS MIGRAINOSUS, UNSPECIFIED MIGRAINE TYPE: ICD-10-CM

## 2023-05-22 RX ORDER — SUMATRIPTAN 100 MG/1
TABLET, FILM COATED ORAL
Qty: 12 TABLET | Refills: 2 | Status: SHIPPED | OUTPATIENT
Start: 2023-05-22

## 2023-05-22 RX ORDER — AMITRIPTYLINE HYDROCHLORIDE 25 MG/1
25 TABLET, FILM COATED ORAL NIGHTLY
Qty: 30 TABLET | Refills: 5 | Status: SHIPPED | OUTPATIENT
Start: 2023-05-22

## 2023-05-22 NOTE — TELEPHONE ENCOUNTER
Last Appointment:  5/24/2022  Future Appointments   Date Time Provider Jose Bush   5/30/2023  2:00 PM Dyan Matthews  [facility]

## 2023-07-11 ENCOUNTER — OFFICE VISIT (OUTPATIENT)
Dept: PRIMARY CARE CLINIC | Age: 33
End: 2023-07-11
Payer: COMMERCIAL

## 2023-07-11 VITALS
SYSTOLIC BLOOD PRESSURE: 102 MMHG | HEART RATE: 79 BPM | WEIGHT: 132 LBS | BODY MASS INDEX: 23.38 KG/M2 | TEMPERATURE: 97.4 F | OXYGEN SATURATION: 99 % | DIASTOLIC BLOOD PRESSURE: 70 MMHG

## 2023-07-11 DIAGNOSIS — G43.911 INTRACTABLE MIGRAINE WITH STATUS MIGRAINOSUS, UNSPECIFIED MIGRAINE TYPE: ICD-10-CM

## 2023-07-11 DIAGNOSIS — B00.1 HERPES LABIALIS: ICD-10-CM

## 2023-07-11 DIAGNOSIS — Z00.01 ENCOUNTER FOR GENERAL ADULT MEDICAL EXAMINATION WITH ABNORMAL FINDINGS: Primary | ICD-10-CM

## 2023-07-11 PROCEDURE — 99395 PREV VISIT EST AGE 18-39: CPT | Performed by: FAMILY MEDICINE

## 2023-07-11 RX ORDER — SUMATRIPTAN 100 MG/1
TABLET, FILM COATED ORAL
Qty: 12 TABLET | Refills: 5 | Status: SHIPPED | OUTPATIENT
Start: 2023-07-11

## 2023-07-11 RX ORDER — VALACYCLOVIR HYDROCHLORIDE 1 G/1
2000 TABLET, FILM COATED ORAL 2 TIMES DAILY
Qty: 4 TABLET | Refills: 5 | Status: SHIPPED | OUTPATIENT
Start: 2023-07-11

## 2023-07-11 RX ORDER — AMITRIPTYLINE HYDROCHLORIDE 25 MG/1
25 TABLET, FILM COATED ORAL NIGHTLY
Qty: 90 TABLET | Refills: 1 | Status: SHIPPED | OUTPATIENT
Start: 2023-07-11

## 2023-07-11 RX ORDER — CLINDAMYCIN AND BENZOYL PEROXIDE 10; 50 MG/G; MG/G
GEL TOPICAL
COMMUNITY
Start: 2023-04-06

## 2023-07-11 SDOH — ECONOMIC STABILITY: FOOD INSECURITY: WITHIN THE PAST 12 MONTHS, THE FOOD YOU BOUGHT JUST DIDN'T LAST AND YOU DIDN'T HAVE MONEY TO GET MORE.: NEVER TRUE

## 2023-07-11 SDOH — ECONOMIC STABILITY: HOUSING INSECURITY
IN THE LAST 12 MONTHS, WAS THERE A TIME WHEN YOU DID NOT HAVE A STEADY PLACE TO SLEEP OR SLEPT IN A SHELTER (INCLUDING NOW)?: NO

## 2023-07-11 SDOH — ECONOMIC STABILITY: INCOME INSECURITY: HOW HARD IS IT FOR YOU TO PAY FOR THE VERY BASICS LIKE FOOD, HOUSING, MEDICAL CARE, AND HEATING?: NOT HARD AT ALL

## 2023-07-11 SDOH — ECONOMIC STABILITY: FOOD INSECURITY: WITHIN THE PAST 12 MONTHS, YOU WORRIED THAT YOUR FOOD WOULD RUN OUT BEFORE YOU GOT MONEY TO BUY MORE.: NEVER TRUE

## 2023-07-11 ASSESSMENT — PATIENT HEALTH QUESTIONNAIRE - PHQ9
SUM OF ALL RESPONSES TO PHQ QUESTIONS 1-9: 0
SUM OF ALL RESPONSES TO PHQ QUESTIONS 1-9: 0
2. FEELING DOWN, DEPRESSED OR HOPELESS: 0
1. LITTLE INTEREST OR PLEASURE IN DOING THINGS: 0
SUM OF ALL RESPONSES TO PHQ QUESTIONS 1-9: 0
SUM OF ALL RESPONSES TO PHQ9 QUESTIONS 1 & 2: 0
SUM OF ALL RESPONSES TO PHQ QUESTIONS 1-9: 0

## 2023-07-11 ASSESSMENT — ENCOUNTER SYMPTOMS
SHORTNESS OF BREATH: 0
GASTROINTESTINAL NEGATIVE: 1

## 2023-07-11 NOTE — PROGRESS NOTES
above.      Reviewed age and gender appropriate health screening exams and vaccinations. Advised patient regarding importance of keeping up with recommended health maintenance and to schedule as soon as possible if overdue, as this is important in assessing for undiagnosed pathology, especially cancer, as well as protecting against potentially harmful/life threatening disease. If discussed, any educational materials and/or home exercises printed for patient's review and were included in patient instructions on his/her After Visit Summary and given to patient at the end of visit. Advised patient to call with any new medication issues, and and other concerns/complaints prior to scheduled follow up. All questions answered to the patient's satisfaction.         Seen By:  Andrea Jimenez MD

## 2024-02-01 ENCOUNTER — OFFICE VISIT (OUTPATIENT)
Dept: PRIMARY CARE CLINIC | Age: 34
End: 2024-02-01
Payer: COMMERCIAL

## 2024-02-01 VITALS
OXYGEN SATURATION: 98 % | DIASTOLIC BLOOD PRESSURE: 68 MMHG | WEIGHT: 136.4 LBS | HEIGHT: 63 IN | SYSTOLIC BLOOD PRESSURE: 96 MMHG | BODY MASS INDEX: 24.17 KG/M2 | RESPIRATION RATE: 14 BRPM | TEMPERATURE: 98.4 F | HEART RATE: 81 BPM

## 2024-02-01 DIAGNOSIS — H65.91 RIGHT OTITIS MEDIA WITH EFFUSION: Primary | ICD-10-CM

## 2024-02-01 DIAGNOSIS — G43.911 INTRACTABLE MIGRAINE WITH STATUS MIGRAINOSUS, UNSPECIFIED MIGRAINE TYPE: ICD-10-CM

## 2024-02-01 DIAGNOSIS — L01.00 IMPETIGO: ICD-10-CM

## 2024-02-01 DIAGNOSIS — B00.1 HERPES LABIALIS: ICD-10-CM

## 2024-02-01 PROCEDURE — 99213 OFFICE O/P EST LOW 20 MIN: CPT | Performed by: EMERGENCY MEDICINE

## 2024-02-01 PROCEDURE — G8427 DOCREV CUR MEDS BY ELIG CLIN: HCPCS | Performed by: EMERGENCY MEDICINE

## 2024-02-01 PROCEDURE — G8420 CALC BMI NORM PARAMETERS: HCPCS | Performed by: EMERGENCY MEDICINE

## 2024-02-01 PROCEDURE — G8484 FLU IMMUNIZE NO ADMIN: HCPCS | Performed by: EMERGENCY MEDICINE

## 2024-02-01 PROCEDURE — 1036F TOBACCO NON-USER: CPT | Performed by: EMERGENCY MEDICINE

## 2024-02-01 RX ORDER — CETIRIZINE HYDROCHLORIDE 10 MG/1
10 TABLET ORAL NIGHTLY
Qty: 30 TABLET | Refills: 0 | Status: SHIPPED | OUTPATIENT
Start: 2024-02-01 | End: 2024-03-02

## 2024-02-01 RX ORDER — SUMATRIPTAN 100 MG/1
TABLET, FILM COATED ORAL
Qty: 12 TABLET | Refills: 5 | Status: SHIPPED | OUTPATIENT
Start: 2024-02-01

## 2024-02-01 RX ORDER — VALACYCLOVIR HYDROCHLORIDE 1 G/1
2000 TABLET, FILM COATED ORAL 2 TIMES DAILY
Qty: 4 TABLET | Refills: 5 | Status: SHIPPED | OUTPATIENT
Start: 2024-02-01

## 2024-02-01 RX ORDER — PREDNISONE 20 MG/1
20 TABLET ORAL 2 TIMES DAILY
Qty: 10 TABLET | Refills: 0 | Status: SHIPPED | OUTPATIENT
Start: 2024-02-01 | End: 2024-02-06

## 2024-02-01 RX ORDER — AMITRIPTYLINE HYDROCHLORIDE 25 MG/1
25 TABLET, FILM COATED ORAL NIGHTLY
Qty: 90 TABLET | Refills: 1 | Status: SHIPPED | OUTPATIENT
Start: 2024-02-01

## 2024-02-01 RX ORDER — CEPHALEXIN 500 MG/1
500 CAPSULE ORAL 2 TIMES DAILY
Qty: 20 CAPSULE | Refills: 0 | Status: SHIPPED | OUTPATIENT
Start: 2024-02-01 | End: 2024-02-11

## 2024-02-01 ASSESSMENT — PATIENT HEALTH QUESTIONNAIRE - PHQ9
SUM OF ALL RESPONSES TO PHQ QUESTIONS 1-9: 0
SUM OF ALL RESPONSES TO PHQ QUESTIONS 1-9: 0
SUM OF ALL RESPONSES TO PHQ9 QUESTIONS 1 & 2: 0
1. LITTLE INTEREST OR PLEASURE IN DOING THINGS: 0
SUM OF ALL RESPONSES TO PHQ QUESTIONS 1-9: 0
2. FEELING DOWN, DEPRESSED OR HOPELESS: 0
SUM OF ALL RESPONSES TO PHQ QUESTIONS 1-9: 0

## 2024-02-01 ASSESSMENT — ENCOUNTER SYMPTOMS
DIARRHEA: 0
EYE PAIN: 0
NAUSEA: 0
VOMITING: 0
SINUS PRESSURE: 0
BACK PAIN: 0
WHEEZING: 0
COUGH: 0
EYE DISCHARGE: 0
SORE THROAT: 0
EYE REDNESS: 0
SHORTNESS OF BREATH: 0
ABDOMINAL DISTENTION: 0

## 2024-02-01 NOTE — TELEPHONE ENCOUNTER
Last Appointment:  7/11/2023  Future Appointments   Date Time Provider Department Center   7/15/2024  7:00 AM [facility]

## 2024-02-01 NOTE — PROGRESS NOTES
Chief Complaint:   Otalgia (Right ear, she feels like fluid in it, started 5 days ago )      History of Present Illness   HPI:  Berkley Garcia is a 33 y.o. female who [facility] right [facility] pressure, crackling, facial rash on chin near lower lip    Prior to Visit Medications    Medication Sig Taking? Authorizing Provider   valACYclovir (VALTREX) 1 g tablet Take 2 tablets by mouth 2 times daily Yes Alejandro Shore, DO   predniSONE (DELTASONE) 20 MG tablet Take 1 tablet by mouth 2 times daily for 5 days Yes Alejandro Shore DO   cetirizine (ZYRTEC) 10 MG tablet Take 1 tablet by mouth nightly for 30 doses Yes Alejandro Shore DO   cephALEXin (KEFLEX) 500 MG capsule Take 1 capsule by mouth 2 times daily for 10 days Yes Alejandro Shore, DO   mupirocin (BACTROBAN) 2 % ointment Apply topically 3 times daily. Yes Alejandro Shore, DO   clindamycin-benzoyl peroxide (BENZACLIN) 1-5 % gel APPLY DAILY Yes Steven House MD   SUMAtriptan (IMITREX) 100 MG tablet TAKE 1 TABLET BY MOUTH ONCE DAILY IF NEEDED AT ONSET OF A HEADACHE. Can repeat again after 2 hours. No more than 2 pills in any 24 hour period. Yes Rakel Rojas MD   amitriptyline (ELAVIL) 25 MG tablet Take 1 tablet by mouth nightly Yes Rakel Rojas MD   medroxyPROGESTERone (DEPO-PROVERA) 150 MG/ML injection inject 1 milliliter intramuscularly every 3 MONTHS  Patient not taking: Reported on 2/1/2024  Steven House MD       Review of Systems   Review of Systems   Constitutional:  Negative for chills and fever.   HENT:  Positive for ear pain. Negative for sinus pressure and sore throat.    Eyes:  Negative for pain, discharge and redness.   Respiratory:  Negative for cough, shortness of breath and wheezing.    Cardiovascular:  Negative for chest pain.   Gastrointestinal:  Negative for abdominal distention, diarrhea, nausea and vomiting.   Genitourinary:  Negative for dysuria and frequency.   Musculoskeletal:  Negative

## 2024-02-26 ENCOUNTER — OFFICE VISIT (OUTPATIENT)
Dept: PRIMARY CARE CLINIC | Age: 34
End: 2024-02-26
Payer: COMMERCIAL

## 2024-02-26 VITALS
TEMPERATURE: 97.7 F | WEIGHT: 133.6 LBS | BODY MASS INDEX: 23.67 KG/M2 | HEIGHT: 63 IN | OXYGEN SATURATION: 98 % | DIASTOLIC BLOOD PRESSURE: 70 MMHG | HEART RATE: 77 BPM | SYSTOLIC BLOOD PRESSURE: 104 MMHG

## 2024-02-26 DIAGNOSIS — J02.9 SORE THROAT: ICD-10-CM

## 2024-02-26 DIAGNOSIS — H65.91 RIGHT OTITIS MEDIA WITH EFFUSION: ICD-10-CM

## 2024-02-26 DIAGNOSIS — H68.011: Primary | ICD-10-CM

## 2024-02-26 LAB — S PYO AG THROAT QL: NORMAL

## 2024-02-26 PROCEDURE — 1036F TOBACCO NON-USER: CPT | Performed by: NURSE PRACTITIONER

## 2024-02-26 PROCEDURE — G8484 FLU IMMUNIZE NO ADMIN: HCPCS | Performed by: NURSE PRACTITIONER

## 2024-02-26 PROCEDURE — G8427 DOCREV CUR MEDS BY ELIG CLIN: HCPCS | Performed by: NURSE PRACTITIONER

## 2024-02-26 PROCEDURE — G8420 CALC BMI NORM PARAMETERS: HCPCS | Performed by: NURSE PRACTITIONER

## 2024-02-26 PROCEDURE — 87880 STREP A ASSAY W/OPTIC: CPT | Performed by: NURSE PRACTITIONER

## 2024-02-26 PROCEDURE — 99213 OFFICE O/P EST LOW 20 MIN: CPT | Performed by: NURSE PRACTITIONER

## 2024-02-26 RX ORDER — GUAIFENESIN 600 MG/1
600 TABLET, EXTENDED RELEASE ORAL 2 TIMES DAILY
Qty: 30 TABLET | Refills: 0 | Status: SHIPPED | OUTPATIENT
Start: 2024-02-26 | End: 2024-03-12

## 2024-02-26 RX ORDER — FLUCONAZOLE 150 MG/1
150 TABLET ORAL
Qty: 2 TABLET | Refills: 0 | Status: SHIPPED | OUTPATIENT
Start: 2024-02-26 | End: 2024-03-03

## 2024-02-26 RX ORDER — AZITHROMYCIN 250 MG/1
TABLET, FILM COATED ORAL
Qty: 6 TABLET | Refills: 0 | Status: SHIPPED | OUTPATIENT
Start: 2024-02-26 | End: 2024-03-07

## 2024-02-26 NOTE — PROGRESS NOTES
None Detected   ] Pt advised that otitis mediaillness is likely bacterialand should resolve with time and conservative measures. Script written for Azithromycinside effects discussed. Increase fluids and rest. NSAIDs prn pain/fever. F/u PCP Return if symptoms worsen or fail to improve. if symptoms persist. ED sooner if symptoms worsen or change. ED immediately with the development of refractory fever, shaking chills, dyspnea, dysphagia, neck stiffness, vomiting, etc. Pt is in agreement with this care plan. All questions answered.        Rojelio Soliman, APRN - CNP

## 2024-03-12 ENCOUNTER — OFFICE VISIT (OUTPATIENT)
Dept: PRIMARY CARE CLINIC | Age: 34
End: 2024-03-12
Payer: COMMERCIAL

## 2024-03-12 VITALS
TEMPERATURE: 97.5 F | SYSTOLIC BLOOD PRESSURE: 100 MMHG | DIASTOLIC BLOOD PRESSURE: 80 MMHG | BODY MASS INDEX: 23.99 KG/M2 | OXYGEN SATURATION: 98 % | HEIGHT: 63 IN | HEART RATE: 75 BPM | WEIGHT: 135.4 LBS

## 2024-03-12 DIAGNOSIS — J02.9 SORE THROAT: ICD-10-CM

## 2024-03-12 DIAGNOSIS — J01.40 ACUTE NON-RECURRENT PANSINUSITIS: Primary | ICD-10-CM

## 2024-03-12 DIAGNOSIS — R05.1 ACUTE COUGH: ICD-10-CM

## 2024-03-12 DIAGNOSIS — H69.93 ETD (EUSTACHIAN TUBE DYSFUNCTION), BILATERAL: ICD-10-CM

## 2024-03-12 PROCEDURE — G8427 DOCREV CUR MEDS BY ELIG CLIN: HCPCS | Performed by: EMERGENCY MEDICINE

## 2024-03-12 PROCEDURE — 1036F TOBACCO NON-USER: CPT | Performed by: EMERGENCY MEDICINE

## 2024-03-12 PROCEDURE — G8420 CALC BMI NORM PARAMETERS: HCPCS | Performed by: EMERGENCY MEDICINE

## 2024-03-12 PROCEDURE — 99213 OFFICE O/P EST LOW 20 MIN: CPT | Performed by: EMERGENCY MEDICINE

## 2024-03-12 PROCEDURE — G8484 FLU IMMUNIZE NO ADMIN: HCPCS | Performed by: EMERGENCY MEDICINE

## 2024-03-12 RX ORDER — BENZONATATE 200 MG/1
200 CAPSULE ORAL 3 TIMES DAILY PRN
Qty: 30 CAPSULE | Refills: 0 | Status: SHIPPED | OUTPATIENT
Start: 2024-03-12 | End: 2024-03-19

## 2024-03-12 RX ORDER — CEFDINIR 300 MG/1
300 CAPSULE ORAL 2 TIMES DAILY
Qty: 20 CAPSULE | Refills: 0 | Status: SHIPPED | OUTPATIENT
Start: 2024-03-12 | End: 2024-03-22

## 2024-03-12 ASSESSMENT — ENCOUNTER SYMPTOMS
VOMITING: 0
EYE DISCHARGE: 0
EYE REDNESS: 0
WHEEZING: 0
BACK PAIN: 0
SINUS PRESSURE: 1
DIARRHEA: 0
NAUSEA: 0
SORE THROAT: 1
SHORTNESS OF BREATH: 0
COUGH: 1
EYE PAIN: 0
ABDOMINAL DISTENTION: 0

## 2024-03-12 NOTE — PROGRESS NOTES
Chief Complaint:   Sinus Problem (Nasal congestion, cough, symptoms since friday) and Pharyngitis      History of Present Illness   HPI:  Berkley Garcia is a 33 y.o. female who [facility] for sinus pressure, drainage, sore throat and cough.      Prior to Visit Medications    Medication Sig Taking? Authorizing Provider   benzonatate (TESSALON) 200 MG capsule Take 1 capsule by mouth 3 times daily as needed for Cough Yes Alejandro Shore DO   cefdinir (OMNICEF) 300 MG capsule Take 1 capsule by mouth 2 times daily for 10 days Yes Alejandro Shore,    guaiFENesin (MUCINEX) 600 MG extended release tablet Take 1 tablet by mouth 2 times daily for 15 days Yes Rojelio Soliman APRN - CNP   SUMAtriptan (IMITREX) 100 MG tablet TAKE 1 TABLET BY MOUTH ONCE DAILY IF NEEDED AT ONSET OF A HEADACHE. Can repeat again after 2 hours. No more than 2 pills in any 24 hour period. Yes Rakel Rojas MD   amitriptyline (ELAVIL) 25 MG tablet Take 1 tablet by mouth nightly Yes Rakel Rojas MD   clindamycin-benzoyl peroxide (BENZACLIN) 1-5 % gel APPLY DAILY Yes ProviderSteven MD   valACYclovir (VALTREX) 1 g tablet Take 2 tablets by mouth 2 times daily  Patient not taking: Reported on 3/12/2024  Alejandro Shore DO   medroxyPROGESTERone (DEPO-PROVERA) 150 MG/ML injection inject 1 milliliter intramuscularly every 3 MONTHS  Patient not taking: Reported on 2/1/2024  ProviderSteven MD       Review of Systems   Review of Systems   Constitutional:  Positive for activity change. Negative for chills and fever.   HENT:  Positive for congestion, sinus pressure and sore throat. Negative for ear pain.    Eyes:  Negative for pain, discharge and redness.   Respiratory:  Positive for cough. Negative for shortness of breath and wheezing.    Cardiovascular:  Negative for chest pain.   Gastrointestinal:  Negative for abdominal distention, diarrhea, nausea and vomiting.   Genitourinary:  Negative for dysuria

## 2024-05-01 ENCOUNTER — OFFICE VISIT (OUTPATIENT)
Dept: PRIMARY CARE CLINIC | Age: 34
End: 2024-05-01

## 2024-05-01 VITALS
WEIGHT: 133 LBS | BODY MASS INDEX: 23.57 KG/M2 | DIASTOLIC BLOOD PRESSURE: 70 MMHG | SYSTOLIC BLOOD PRESSURE: 108 MMHG | TEMPERATURE: 98.2 F | HEIGHT: 63 IN | HEART RATE: 71 BPM | OXYGEN SATURATION: 98 %

## 2024-05-01 DIAGNOSIS — G43.109 MIGRAINE WITH AURA AND WITHOUT STATUS MIGRAINOSUS, NOT INTRACTABLE: Primary | ICD-10-CM

## 2024-05-01 RX ORDER — SUMATRIPTAN 100 MG/1
TABLET, FILM COATED ORAL
Qty: 12 TABLET | Refills: 5 | Status: SHIPPED | OUTPATIENT
Start: 2024-05-01

## 2024-05-01 RX ORDER — ERENUMAB-AOOE 70 MG/ML
70 INJECTION SUBCUTANEOUS
Qty: 1 ADJUSTABLE DOSE PRE-FILLED PEN SYRINGE | Refills: 3 | Status: SHIPPED | OUTPATIENT
Start: 2024-05-01

## 2024-05-01 RX ORDER — AMITRIPTYLINE HYDROCHLORIDE 25 MG/1
25 TABLET, FILM COATED ORAL NIGHTLY
Qty: 90 TABLET | Refills: 1 | Status: CANCELLED | OUTPATIENT
Start: 2024-05-01

## 2024-05-01 RX ORDER — ETONOGESTREL 68 MG/1
68 IMPLANT SUBCUTANEOUS ONCE
Qty: 68 EACH | Refills: 0
Start: 2024-05-01 | End: 2024-05-01

## 2024-05-01 NOTE — PROGRESS NOTES
24  Berkley Garcia : 1990 Sex: female  Age: 34 y.o.      Assessment and Plan:  Berkley was seen today for follow-up.    Diagnoses and all orders for this visit:    Migraine with aura and without status migrainosus, not intractable  -     SUMAtriptan (IMITREX) 100 MG tablet; TAKE 1 TABLET BY MOUTH ONCE DAILY IF NEEDED AT ONSET OF A HEADACHE. Can repeat again after 2 hours. No more than 2 pills in any 24 hour period.  -     Erenumab-aooe (AIMOVIG) 70 MG/ML SOAJ; Inject 70 mg into the skin every 30 days  Uncontrolled.  Given the side effect she is having with the Elavil, we will discontinue.  Patient has now failed Elavil and Topamax.  She is not a candidate for beta-blockers.  We will try Aimovig.  Reassess 2 to 3 months.      Other orders  -     etonogestrel (NEXPLANON) 68 MG implant; 68 mg by Subdermal route once for 1 dose      I have a longitudinal relationship with this patient and continued responsibility as the focal point for all needed services for his or her care.    Return in about 3 months (around 2024).        Chief Complaint   Patient presents with    Follow-up       HPI  Pt here for routine f/u migraines       Migraines -  Unfortunately these have been way worse  Over the past 8 months or so  Elavil did help initially, but no longer  She also has significant dry mouth with this  Has used over-the-counter Biotene without help; states that even sometimes drinking water seems to make it worse not better  Patient is also previously failed Topamax  I do not think she is a candidate for Inderal or other beta-blockers given her low normal blood pressure    Pt does follow with gyne through [place]   [facility] with PAP smears   Patient now has a Nexplanon in place    Problem list reviewed and updated in full with patient today as necessary.  A comprehensive ROS was negative, except as documented above.     Current Outpatient Medications:     SUMAtriptan (IMITREX) 100 MG tablet, TAKE 1 TABLET

## 2024-05-06 ENCOUNTER — TELEPHONE (OUTPATIENT)
Dept: PRIMARY CARE CLINIC | Age: 34
End: 2024-05-06

## 2024-05-06 NOTE — TELEPHONE ENCOUNTER
Last Appointment:  5/1/2024  Future Appointments   Date Time Provider Department Center   8/5/2024  9:45 AM Rakel Rojas MD [facility]      R[facility] called about the Aimovig.  The prior auth does need to be done on the Aimovig.  The payer source needed to be changed.  Done.  Should fall in prior auth que.    Electronically signed by Dina Sanabria LPN on 5/6/2024 at 8:32 AM

## 2024-09-11 ENCOUNTER — OFFICE VISIT (OUTPATIENT)
Dept: PRIMARY CARE CLINIC | Age: 34
End: 2024-09-11
Payer: COMMERCIAL

## 2024-09-11 VITALS
HEIGHT: 63 IN | WEIGHT: 135 LBS | RESPIRATION RATE: 16 BRPM | SYSTOLIC BLOOD PRESSURE: 118 MMHG | TEMPERATURE: 98.1 F | DIASTOLIC BLOOD PRESSURE: 62 MMHG | BODY MASS INDEX: 23.92 KG/M2 | HEART RATE: 77 BPM | OXYGEN SATURATION: 99 %

## 2024-09-11 DIAGNOSIS — Z00.01 ENCOUNTER FOR GENERAL ADULT MEDICAL EXAMINATION WITH ABNORMAL FINDINGS: Primary | ICD-10-CM

## 2024-09-11 DIAGNOSIS — G43.109 MIGRAINE WITH AURA AND WITHOUT STATUS MIGRAINOSUS, NOT INTRACTABLE: ICD-10-CM

## 2024-09-11 PROCEDURE — 99395 PREV VISIT EST AGE 18-39: CPT | Performed by: FAMILY MEDICINE

## 2024-09-11 RX ORDER — DULOXETIN HYDROCHLORIDE 30 MG/1
30 CAPSULE, DELAYED RELEASE ORAL DAILY
Qty: 90 CAPSULE | Refills: 1 | Status: SHIPPED | OUTPATIENT
Start: 2024-09-11

## 2024-09-11 RX ORDER — SUMATRIPTAN 100 MG/1
TABLET, FILM COATED ORAL
Qty: 12 TABLET | Refills: 5 | Status: SHIPPED | OUTPATIENT
Start: 2024-09-11

## 2024-09-11 RX ORDER — ERENUMAB-AOOE 70 MG/ML
70 INJECTION SUBCUTANEOUS
Qty: 1 ADJUSTABLE DOSE PRE-FILLED PEN SYRINGE | Refills: 3 | Status: CANCELLED | OUTPATIENT
Start: 2024-09-11

## 2024-09-11 SDOH — ECONOMIC STABILITY: FOOD INSECURITY: WITHIN THE PAST 12 MONTHS, YOU WORRIED THAT YOUR FOOD WOULD RUN OUT BEFORE YOU GOT MONEY TO BUY MORE.: NEVER TRUE

## 2024-09-11 SDOH — ECONOMIC STABILITY: FOOD INSECURITY: WITHIN THE PAST 12 MONTHS, THE FOOD YOU BOUGHT JUST DIDN'T LAST AND YOU DIDN'T HAVE MONEY TO GET MORE.: NEVER TRUE

## 2024-09-11 SDOH — ECONOMIC STABILITY: INCOME INSECURITY: HOW HARD IS IT FOR YOU TO PAY FOR THE VERY BASICS LIKE FOOD, HOUSING, MEDICAL CARE, AND HEATING?: NOT HARD AT ALL

## 2024-10-18 ENCOUNTER — OFFICE VISIT (OUTPATIENT)
Dept: PRIMARY CARE CLINIC | Age: 34
End: 2024-10-18
Payer: COMMERCIAL

## 2024-10-18 VITALS
DIASTOLIC BLOOD PRESSURE: 62 MMHG | OXYGEN SATURATION: 98 % | WEIGHT: 133 LBS | TEMPERATURE: 97.8 F | HEART RATE: 77 BPM | BODY MASS INDEX: 23.57 KG/M2 | SYSTOLIC BLOOD PRESSURE: 92 MMHG | HEIGHT: 63 IN | RESPIRATION RATE: 16 BRPM

## 2024-10-18 DIAGNOSIS — H69.93 EUSTACHIAN TUBE DISORDER, BILATERAL: Primary | ICD-10-CM

## 2024-10-18 DIAGNOSIS — Z87.81 HISTORY OF FRACTURE OF NASAL BONE: ICD-10-CM

## 2024-10-18 DIAGNOSIS — J31.0 CHRONIC RHINITIS: ICD-10-CM

## 2024-10-18 DIAGNOSIS — J34.2 ACQUIRED DEVIATED NASAL SEPTUM: ICD-10-CM

## 2024-10-18 PROCEDURE — 1036F TOBACCO NON-USER: CPT | Performed by: NURSE PRACTITIONER

## 2024-10-18 PROCEDURE — G8484 FLU IMMUNIZE NO ADMIN: HCPCS | Performed by: NURSE PRACTITIONER

## 2024-10-18 PROCEDURE — 99213 OFFICE O/P EST LOW 20 MIN: CPT | Performed by: NURSE PRACTITIONER

## 2024-10-18 PROCEDURE — G8420 CALC BMI NORM PARAMETERS: HCPCS | Performed by: NURSE PRACTITIONER

## 2024-10-18 PROCEDURE — G8427 DOCREV CUR MEDS BY ELIG CLIN: HCPCS | Performed by: NURSE PRACTITIONER

## 2024-10-18 NOTE — PROGRESS NOTES
Chief Complaint:   Otalgia (Right ear pain for a few weeks worse at night )      History of Present Illness   Source of history provided by:  patient.    Berkley Garcia is a 34 y.o. old female who presents to primary care today for runny nose chronic and ear fullness that happens 3 times a day, which began a few year(s) prior to arrival. The symptoms are associated with past history of nasal fracture times two.  There has been NO bloddy noses . denies any hx of asthma, COPD, or tobacco use.    Review of Systems   Unless otherwise stated in this report or unable to obtain because of the patient's clinical or mental status as evidenced by the medical record, this patients's positive and negative responses for Review of Systems, constitutional, psych, eyes, ENT, cardiovascular, respiratory, gastrointestinal, neurological, genitourinary, musculoskeletal, integument systems and systems related to the presenting problem are either stated in the preceding or were not pertinent or were negative for the symptoms and/or complaints related to the medical problem.    Past Medical History:  has a past medical history of Migraine with aura and without status migrainosus, not intractable.   Past Surgical History:  has no past surgical history on file.  Social History:  reports that she has never smoked. She has never used smokeless tobacco.  Family History: family history includes Migraines in her sister.  Allergies: Patient has no known allergies.    Physical Exam   Vital Signs:  BP 92/62   Pulse 77   Temp 97.8 °F (36.6 °C) (Temporal)   Resp 16   Ht 1.6 m (5' 3\")   Wt 60.3 kg (133 lb)   SpO2 98%   BMI 23.56 kg/m²    Oxygen Saturation Interpretation: Normal.    Constitutional:  Alert, development consistent with age.  Ears: Bilateral pinna normal. TMs normalwith noerythema with noperforation bilaterally.  Canals normal with nocerumen impaction bilaterally without swelling or exudate  Nose:  mildcongestion of the

## 2024-11-06 ENCOUNTER — OFFICE VISIT (OUTPATIENT)
Dept: PRIMARY CARE CLINIC | Age: 34
End: 2024-11-06

## 2024-11-06 VITALS
TEMPERATURE: 98.1 F | HEART RATE: 73 BPM | RESPIRATION RATE: 16 BRPM | WEIGHT: 130.6 LBS | OXYGEN SATURATION: 98 % | BODY MASS INDEX: 24.03 KG/M2 | SYSTOLIC BLOOD PRESSURE: 98 MMHG | HEIGHT: 62 IN | DIASTOLIC BLOOD PRESSURE: 60 MMHG

## 2024-11-06 DIAGNOSIS — J02.9 SORE THROAT: ICD-10-CM

## 2024-11-06 DIAGNOSIS — J32.9 SINOBRONCHITIS: Primary | ICD-10-CM

## 2024-11-06 DIAGNOSIS — J40 SINOBRONCHITIS: Primary | ICD-10-CM

## 2024-11-06 DIAGNOSIS — Z20.818 EXPOSURE TO STREP THROAT: ICD-10-CM

## 2024-11-06 LAB — S PYO AG THROAT QL: NORMAL

## 2024-11-06 RX ORDER — CEFDINIR 300 MG/1
300 CAPSULE ORAL 2 TIMES DAILY
Qty: 20 CAPSULE | Refills: 0 | Status: SHIPPED | OUTPATIENT
Start: 2024-11-06 | End: 2024-11-16

## 2024-11-06 RX ORDER — BENZONATATE 100 MG/1
100 CAPSULE ORAL 3 TIMES DAILY PRN
Qty: 21 CAPSULE | Refills: 0 | Status: SHIPPED | OUTPATIENT
Start: 2024-11-06 | End: 2024-11-13

## 2024-11-06 NOTE — PROGRESS NOTES
A    Exposure to strep throat  -     POCT rapid strep A      Disposition:  Disposition: Discharge to home.    -Rapid strep came back negative. Patient declines all other POCT testing in the office.     -Discussed XR chest, and patient politely declines at this time.     -Reviewed patient's allergies with her. Script written for cefdinir and Tessalon, side effects discussed. Increase fluids and rest. Symptomatic relief discussed, including oral antihistamines and Flonase OTC. F/u PCP in 5-7 days if symptoms persist. ED sooner if symptoms worsen or change. Red flag symptoms discussed. Patient is in agreement with this care plan. All questions answered.    Tasneem Bah PA-C    **This report was transcribed using voice recognition software. Every effort was made to ensure accuracy; however, inadvertent computerized transcription errors may be present.

## 2024-11-11 NOTE — PROGRESS NOTES
22  Nereida Rinne : 1990 Sex: female  Age: 27 y.o. Assessment and Plan:  Hanna Driscoll was seen today for follow-up and annual exam.    Diagnoses and all orders for this visit:    Encounter for general adult medical examination with abnormal findings    Migraine without aura and without status migrainosus, not intractable      Overall stable  Cont current meds as detailed below  F/u with neuro as scheduled  F/u with gyne annually  Defer BW for now  Vaccines encouraged   Recheck 1 year, sooner PRN     Return in about 1 year (around 2023). Chief Complaint   Patient presents with    Follow-up    Annual Exam       HPI  Pt here for routine f/u and annual exam   She is overall doing well and has no real concerns or complaints for me today    Patient's migraines have been under fairly good control  She continues on amitriptyline nightly, with Imitrex for as needed use  This is working well for her  She did have MRI of the brain earlier this year that was reassuring  She has appointment scheduled with neurology next month    H/o herpes labialis, previously treated with Valtrex  Has refills to use as needed    Pt does follow with gyne through [place]   She reports she is UTD with PAP smears   We discussed her other care gaps     Problem list reviewed and updated in full with patient today as necessary. A comprehensive ROS was negative, except as documented above. Review of Systems   Respiratory: Negative. Cardiovascular: Negative. Gastrointestinal: Negative. Genitourinary: Negative. Psychiatric/Behavioral: Negative. Current Outpatient Medications:     SUMAtriptan (IMITREX) 100 MG tablet, TAKE 1 TABLET BY MOUTH ONCE DAILY IF NEEDED AT ONSET OF A HEADACHE. Can repeat again after 2 hours. No more than 2 pills in any 24 hour period. , Disp: 12 tablet, Rfl: 2    clindamycin (CLINDAGEL) 1 % gel, apply topically twice a day, Disp: , Rfl:     medroxyPROGESTERone Patient calling asking what dosage Sunshine is ordering her zepbound  - please call    (DEPO-PROVERA) 150 MG/ML injection, inject 1 milliliter intramuscularly every 3 MONTHS, Disp: , Rfl:     amitriptyline (ELAVIL) 25 MG tablet, Take 1 tablet by mouth nightly, Disp: 30 tablet, Rfl: 5  No Known Allergies    Pt's past medical and surgical history were reviewed and updated as necessary today   Pt's family and social history were reviewed and updated as necessary today          Vitals:    05/24/22 1358   BP: 98/68   Pulse: 76   Resp: 16   Temp: 97.8 °F (36.6 °C)   TempSrc: Temporal   SpO2: 98%   Weight: 129 lb (58.5 kg)   Height: 5' 3\" (1.6 m)       Physical Exam  Constitutional:       Appearance: Normal appearance. HENT:      Head: Normocephalic and atraumatic. Eyes:      Conjunctiva/sclera: Conjunctivae normal.   Cardiovascular:      Rate and Rhythm: Normal rate and regular rhythm. Heart sounds: Normal heart sounds. Pulmonary:      Effort: Pulmonary effort is normal.      Breath sounds: Normal breath sounds. Abdominal:      Palpations: Abdomen is soft. Tenderness: There is no abdominal tenderness. Musculoskeletal:         General: Normal range of motion. Skin:     General: Skin is warm and dry. Neurological:      General: No focal deficit present. Mental Status: She is alert and oriented to person, place, and time. Psychiatric:         Mood and Affect: Mood normal.         Behavior: Behavior normal.       Counseled patient as appropriate and relevant regarding above diagnosis, including possible risks and complications, especially if left uncontrolled. Counseled patient as appropriate and relevant regarding any  possible side effects, risks, and alternatives to treatment; patient and/or guardian verbalizes understanding, and is in agreement with the plan as detailed above. Reviewed age and gender appropriate health screening exams and vaccinations.   Advised patient regarding importance of keeping up with recommended health maintenance and to schedule as soon as possible if overdue, as this is important in assessing for undiagnosed pathology, especially cancer, as well as protecting against potentially harmful/life threatening disease. If discussed, any educational materials and/or home exercises printed for patient's review and were included in patient instructions on his/her After Visit Summary and given to patient at the end of visit. Advised patient to call with any new medication issues, and and other concerns/complaints prior to scheduled follow up. All questions answered to the patient's satisfaction.         Seen By:  Ronen Yuan MD

## 2024-11-25 ENCOUNTER — OFFICE VISIT (OUTPATIENT)
Dept: PRIMARY CARE CLINIC | Age: 34
End: 2024-11-25
Payer: COMMERCIAL

## 2024-11-25 VITALS
HEART RATE: 76 BPM | BODY MASS INDEX: 22.5 KG/M2 | SYSTOLIC BLOOD PRESSURE: 122 MMHG | WEIGHT: 127 LBS | TEMPERATURE: 97.5 F | DIASTOLIC BLOOD PRESSURE: 70 MMHG | RESPIRATION RATE: 16 BRPM | OXYGEN SATURATION: 97 % | HEIGHT: 63 IN

## 2024-11-25 DIAGNOSIS — R00.2 PALPITATIONS: Primary | ICD-10-CM

## 2024-11-25 PROCEDURE — 99213 OFFICE O/P EST LOW 20 MIN: CPT | Performed by: FAMILY MEDICINE

## 2024-11-25 PROCEDURE — G8420 CALC BMI NORM PARAMETERS: HCPCS | Performed by: FAMILY MEDICINE

## 2024-11-25 PROCEDURE — G8427 DOCREV CUR MEDS BY ELIG CLIN: HCPCS | Performed by: FAMILY MEDICINE

## 2024-11-25 PROCEDURE — 1036F TOBACCO NON-USER: CPT | Performed by: FAMILY MEDICINE

## 2024-11-25 PROCEDURE — G8484 FLU IMMUNIZE NO ADMIN: HCPCS | Performed by: FAMILY MEDICINE

## 2024-11-25 RX ORDER — DULOXETIN HYDROCHLORIDE 30 MG/1
30 CAPSULE, DELAYED RELEASE ORAL DAILY
Qty: 90 CAPSULE | Refills: 1 | Status: CANCELLED | OUTPATIENT
Start: 2024-11-25

## 2024-11-25 RX ORDER — SUMATRIPTAN SUCCINATE 100 MG/1
TABLET ORAL
Qty: 12 TABLET | Refills: 5 | Status: CANCELLED | OUTPATIENT
Start: 2024-11-25

## 2024-11-25 NOTE — PROGRESS NOTES
24  Berkley Garcia : 1990 Sex: female  Age: 34 y.o.      Assessment and Plan:  Berkley was seen today for follow-up from hospital.    Diagnoses and all orders for this visit:    Palpitations    I presume the patient had premature beats, likely due to the Mucinex D she was taking.  She was asymptomatic, and has no red flag symptoms at this time or previously.  We did discuss what to watch for moving forward.  Briefly discussed Holter monitor, but agreed this is not warranted at this time.  Follow-up as needed, and as as otherwise scheduled.      Return if symptoms worsen or fail to improve.        Chief Complaint   Patient presents with    Follow-Up from Hospital     Patient is here for a follow up to urgent care about 3 weeks ago for irregular heartbeat. She was seen for cough and loss of voice        HPI  Pt here for follow-up from urgent care  She was seen in urgent care in [place] for some URI symptoms including laryngitis  At that time she was asked whether or not she had a murmur which she denied, and then told she had an irregular heartbeat and to follow-up with her PCP  Patient reports that she was taking some Mucinex, she thinks with the, at that time  She denies feeling any palpitations or any associated red flag symptoms  Patient is otherwise fairly healthy, and in great shape    Problem list reviewed and updated in full with patient today as necessary.  A comprehensive ROS was negative, except as documented above.     Current Outpatient Medications:     SUMAtriptan (IMITREX) 100 MG tablet, TAKE 1 TABLET BY MOUTH ONCE DAILY IF NEEDED AT ONSET OF A HEADACHE. Can repeat again after 2 hours. No more than 2 pills in any 24 hour period., Disp: 12 tablet, Rfl: 5    DULoxetine (CYMBALTA) 30 MG extended release capsule, Take 1 capsule by mouth daily, Disp: 90 capsule, Rfl: 1    etonogestrel (NEXPLANON) 68 MG implant, 68 mg by Subdermal route once for 1 dose, Disp: 68 each, Rfl: 0  No Known

## 2025-02-11 ENCOUNTER — PATIENT MESSAGE (OUTPATIENT)
Dept: PRIMARY CARE CLINIC | Age: 35
End: 2025-02-11

## 2025-02-11 DIAGNOSIS — B00.1 HERPES LABIALIS: ICD-10-CM

## 2025-02-12 RX ORDER — VALACYCLOVIR HYDROCHLORIDE 1 G/1
2000 TABLET, FILM COATED ORAL 2 TIMES DAILY
Qty: 4 TABLET | Refills: 5 | Status: SHIPPED | OUTPATIENT
Start: 2025-02-12

## 2025-02-12 NOTE — TELEPHONE ENCOUNTER
Can take a daily 500 mg Valtrex suppressive dose to try to prevent them.  That can be done for up to 16 weeks.  I did refill the as needed treatment though.

## 2025-03-19 ENCOUNTER — OFFICE VISIT (OUTPATIENT)
Dept: PRIMARY CARE CLINIC | Age: 35
End: 2025-03-19
Payer: COMMERCIAL

## 2025-03-19 VITALS
BODY MASS INDEX: 22.5 KG/M2 | RESPIRATION RATE: 16 BRPM | HEART RATE: 66 BPM | WEIGHT: 127 LBS | OXYGEN SATURATION: 99 % | SYSTOLIC BLOOD PRESSURE: 118 MMHG | DIASTOLIC BLOOD PRESSURE: 64 MMHG | HEIGHT: 63 IN

## 2025-03-19 DIAGNOSIS — G43.109 MIGRAINE WITH AURA AND WITHOUT STATUS MIGRAINOSUS, NOT INTRACTABLE: Primary | ICD-10-CM

## 2025-03-19 DIAGNOSIS — Z13.220 SCREENING CHOLESTEROL LEVEL: ICD-10-CM

## 2025-03-19 DIAGNOSIS — G43.109 MIGRAINE WITH AURA AND WITHOUT STATUS MIGRAINOSUS, NOT INTRACTABLE: ICD-10-CM

## 2025-03-19 DIAGNOSIS — M77.02 MEDIAL EPICONDYLITIS OF LEFT ELBOW: ICD-10-CM

## 2025-03-19 DIAGNOSIS — B00.1 HERPES LABIALIS: ICD-10-CM

## 2025-03-19 LAB
ALBUMIN: 4.2 G/DL (ref 3.5–5.2)
ALP BLD-CCNC: 58 U/L (ref 35–104)
ALT SERPL-CCNC: 10 U/L (ref 0–32)
ANION GAP SERPL CALCULATED.3IONS-SCNC: 16 MMOL/L (ref 7–16)
AST SERPL-CCNC: 17 U/L (ref 0–31)
BASOPHILS ABSOLUTE: 0.02 K/UL (ref 0–0.2)
BASOPHILS RELATIVE PERCENT: 0 % (ref 0–2)
BILIRUB SERPL-MCNC: 0.3 MG/DL (ref 0–1.2)
BILIRUBIN DIRECT: <0.2 MG/DL (ref 0–0.3)
BILIRUBIN, INDIRECT: NORMAL MG/DL (ref 0–1)
BUN BLDV-MCNC: 10 MG/DL (ref 6–20)
CALCIUM SERPL-MCNC: 9.4 MG/DL (ref 8.6–10.2)
CHLORIDE BLD-SCNC: 107 MMOL/L (ref 98–107)
CHOLESTEROL, TOTAL: 145 MG/DL
CO2: 18 MMOL/L (ref 22–29)
CREAT SERPL-MCNC: 0.7 MG/DL (ref 0.5–1)
EOSINOPHILS ABSOLUTE: 0.07 K/UL (ref 0.05–0.5)
EOSINOPHILS RELATIVE PERCENT: 1 % (ref 0–6)
GFR, ESTIMATED: >90 ML/MIN/1.73M2
GLUCOSE BLD-MCNC: 89 MG/DL (ref 74–99)
HCT VFR BLD CALC: 38 % (ref 34–48)
HDLC SERPL-MCNC: 65 MG/DL
HEMOGLOBIN: 12.6 G/DL (ref 11.5–15.5)
IMMATURE GRANULOCYTES %: 0 % (ref 0–5)
IMMATURE GRANULOCYTES ABSOLUTE: <0.03 K/UL (ref 0–0.58)
LDL CHOLESTEROL: 70 MG/DL
LYMPHOCYTES ABSOLUTE: 1.59 K/UL (ref 1.5–4)
LYMPHOCYTES RELATIVE PERCENT: 28 % (ref 20–42)
MCH RBC QN AUTO: 33.2 PG (ref 26–35)
MCHC RBC AUTO-ENTMCNC: 33.2 G/DL (ref 32–34.5)
MCV RBC AUTO: 100.3 FL (ref 80–99.9)
MONOCYTES ABSOLUTE: 0.4 K/UL (ref 0.1–0.95)
MONOCYTES RELATIVE PERCENT: 7 % (ref 2–12)
NEUTROPHILS ABSOLUTE: 3.51 K/UL (ref 1.8–7.3)
NEUTROPHILS RELATIVE PERCENT: 63 % (ref 43–80)
PDW BLD-RTO: 11.8 % (ref 11.5–15)
PLATELET # BLD: 238 K/UL (ref 130–450)
PMV BLD AUTO: 11.1 FL (ref 7–12)
POTASSIUM SERPL-SCNC: 4.6 MMOL/L (ref 3.5–5)
RBC # BLD: 3.79 M/UL (ref 3.5–5.5)
SODIUM BLD-SCNC: 141 MMOL/L (ref 132–146)
TOTAL PROTEIN: 6.9 G/DL (ref 6.4–8.3)
TRIGL SERPL-MCNC: 49 MG/DL
TSH SERPL DL<=0.05 MIU/L-ACNC: 0.94 UIU/ML (ref 0.27–4.2)
VITAMIN B-12: 433 PG/ML (ref 211–946)
VLDLC SERPL CALC-MCNC: 10 MG/DL
WBC # BLD: 5.6 K/UL (ref 4.5–11.5)

## 2025-03-19 PROCEDURE — 1036F TOBACCO NON-USER: CPT | Performed by: FAMILY MEDICINE

## 2025-03-19 PROCEDURE — G8420 CALC BMI NORM PARAMETERS: HCPCS | Performed by: FAMILY MEDICINE

## 2025-03-19 PROCEDURE — G8427 DOCREV CUR MEDS BY ELIG CLIN: HCPCS | Performed by: FAMILY MEDICINE

## 2025-03-19 PROCEDURE — 99214 OFFICE O/P EST MOD 30 MIN: CPT | Performed by: FAMILY MEDICINE

## 2025-03-19 RX ORDER — VALACYCLOVIR HYDROCHLORIDE 1 G/1
2000 TABLET, FILM COATED ORAL 2 TIMES DAILY
Qty: 4 TABLET | Refills: 5 | Status: SHIPPED | OUTPATIENT
Start: 2025-03-19

## 2025-03-19 RX ORDER — PREDNISONE 10 MG/1
TABLET ORAL
Qty: 18 TABLET | Refills: 0 | Status: SHIPPED | OUTPATIENT
Start: 2025-03-19

## 2025-03-19 RX ORDER — SUMATRIPTAN SUCCINATE 100 MG/1
TABLET ORAL
Qty: 12 TABLET | Refills: 5 | Status: SHIPPED | OUTPATIENT
Start: 2025-03-19

## 2025-03-19 RX ORDER — DULOXETIN HYDROCHLORIDE 30 MG/1
30 CAPSULE, DELAYED RELEASE ORAL DAILY
Qty: 90 CAPSULE | Refills: 1 | Status: SHIPPED | OUTPATIENT
Start: 2025-03-19

## 2025-03-19 SDOH — ECONOMIC STABILITY: FOOD INSECURITY: WITHIN THE PAST 12 MONTHS, THE FOOD YOU BOUGHT JUST DIDN'T LAST AND YOU DIDN'T HAVE MONEY TO GET MORE.: NEVER TRUE

## 2025-03-19 SDOH — ECONOMIC STABILITY: FOOD INSECURITY: WITHIN THE PAST 12 MONTHS, YOU WORRIED THAT YOUR FOOD WOULD RUN OUT BEFORE YOU GOT MONEY TO BUY MORE.: NEVER TRUE

## 2025-03-19 ASSESSMENT — PATIENT HEALTH QUESTIONNAIRE - PHQ9
1. LITTLE INTEREST OR PLEASURE IN DOING THINGS: NOT AT ALL
SUM OF ALL RESPONSES TO PHQ QUESTIONS 1-9: 0
2. FEELING DOWN, DEPRESSED OR HOPELESS: NOT AT ALL
SUM OF ALL RESPONSES TO PHQ QUESTIONS 1-9: 0

## 2025-03-19 NOTE — PROGRESS NOTES
Advised patient regarding importance of keeping up with recommended health maintenance and to schedule as soon as possible if overdue, as this is important in assessing for undiagnosed pathology, especially cancer, as well as protecting against potentially harmful/life threatening disease.      If discussed, any educational materials and/or home exercises printed for patient's review and were included in patient instructions on his/her After Visit Summary and given to patient at the end of visit.      Advised patient to call with any new medication issues, and and other concerns/complaints prior to scheduled follow up.  All questions answered to the patient's satisfaction.        Seen By:  Rakel Rojas MD

## 2025-03-21 ENCOUNTER — RESULTS FOLLOW-UP (OUTPATIENT)
Dept: PRIMARY CARE CLINIC | Age: 35
End: 2025-03-21

## 2025-05-02 ENCOUNTER — OFFICE VISIT (OUTPATIENT)
Dept: PRIMARY CARE CLINIC | Age: 35
End: 2025-05-02
Payer: COMMERCIAL

## 2025-05-02 VITALS
OXYGEN SATURATION: 98 % | TEMPERATURE: 97.5 F | SYSTOLIC BLOOD PRESSURE: 118 MMHG | RESPIRATION RATE: 16 BRPM | BODY MASS INDEX: 23.25 KG/M2 | HEIGHT: 63 IN | WEIGHT: 131.2 LBS | HEART RATE: 83 BPM | DIASTOLIC BLOOD PRESSURE: 82 MMHG

## 2025-05-02 DIAGNOSIS — R05.9 COUGH, UNSPECIFIED TYPE: Primary | ICD-10-CM

## 2025-05-02 DIAGNOSIS — J04.0 ACUTE VIRAL LARYNGITIS: ICD-10-CM

## 2025-05-02 DIAGNOSIS — J02.9 SORE THROAT: ICD-10-CM

## 2025-05-02 LAB — S PYO AG THROAT QL: NORMAL

## 2025-05-02 PROCEDURE — 87880 STREP A ASSAY W/OPTIC: CPT

## 2025-05-02 PROCEDURE — G8420 CALC BMI NORM PARAMETERS: HCPCS

## 2025-05-02 PROCEDURE — 99213 OFFICE O/P EST LOW 20 MIN: CPT

## 2025-05-02 PROCEDURE — 1036F TOBACCO NON-USER: CPT

## 2025-05-02 PROCEDURE — G8427 DOCREV CUR MEDS BY ELIG CLIN: HCPCS

## 2025-05-02 RX ORDER — METHYLPREDNISOLONE 4 MG/1
TABLET ORAL
Qty: 1 KIT | Refills: 0 | Status: SHIPPED | OUTPATIENT
Start: 2025-05-02

## 2025-05-02 RX ORDER — ESTRADIOL 1 MG/1
1 TABLET ORAL DAILY
COMMUNITY
Start: 2025-03-17

## 2025-05-02 NOTE — PROGRESS NOTES
Berkley Garcia (:  1990) is a 35 y.o. female, here for evaluation of the following chief complaint(s):    History of Present Illness  The patient presents for evaluation of hoarseness and a mild sore throat.    Symptoms began on Wednesday afternoon. Zyrtec has been taken for symptom relief, but it has not been effective. A slight cough is reported today, but there are no fevers or chills. No recent contact with sick individuals is noted. There is no history of recent strep throat, and overall good health is reported. A tonsillectomy has been performed in the past. Steroids are tolerated well.    PAST SURGICAL HISTORY: Tonsillectomy    Review of Systems - negative except as listed above in the HPI    Physical Exam         VS:  /82 (BP Site: Left Upper Arm, Patient Position: Sitting, BP Cuff Size: Large Adult)   Pulse 83   Temp 97.5 °F (36.4 °C) (Temporal)   Resp 16   Ht 1.6 m (5' 3\")   Wt 59.5 kg (131 lb 3.2 oz)   SpO2 98%   BMI 23.24 kg/m²    Oxygen Saturation Interpretation: Normal.    Constitutional:  Alert, development consistent with age.  Ears:  External Ears: Bilateral pinna normal. TMs are without erythema or perforation bilaterally.  Canals normal bilaterally without swelling or exudate  Nose: Negative for congestion of the nasal mucosa. There is injection to middle turbinates bilaterally.   Throat: The patient had tonsillectomy, airway patent, mild erythema to posterior pharynx.  There is no exudate.  Neck:  Supple. There is no  anterior cervical adenopathy.  Lungs: CTAB without wheezes, rales, or rhonchi  Heart:  Regular rate and rhythm, normal heart sounds, without pathological murmurs, ectopy, gallops, or rubs.  Skin:  Normal turgor.  Warm, dry, without visible rash.  Neurological:  Alert and oriented.  Motor functions intact.  Responds to verbal commands.     Lab / Imaging Results   (All laboratory and radiology results have been personally reviewed by

## 2025-05-09 ENCOUNTER — OFFICE VISIT (OUTPATIENT)
Dept: PRIMARY CARE CLINIC | Age: 35
End: 2025-05-09

## 2025-05-09 VITALS
DIASTOLIC BLOOD PRESSURE: 70 MMHG | HEIGHT: 63 IN | RESPIRATION RATE: 16 BRPM | HEART RATE: 68 BPM | OXYGEN SATURATION: 98 % | BODY MASS INDEX: 23.85 KG/M2 | TEMPERATURE: 97.5 F | SYSTOLIC BLOOD PRESSURE: 118 MMHG | WEIGHT: 134.6 LBS

## 2025-05-09 DIAGNOSIS — J01.40 ACUTE NON-RECURRENT PANSINUSITIS: Primary | ICD-10-CM

## 2025-05-09 DIAGNOSIS — J32.9 SINOBRONCHITIS: ICD-10-CM

## 2025-05-09 DIAGNOSIS — J40 SINOBRONCHITIS: ICD-10-CM

## 2025-05-09 DIAGNOSIS — H69.93 EUSTACHIAN TUBE DISORDER, BILATERAL: ICD-10-CM

## 2025-05-09 RX ORDER — BENZONATATE 200 MG/1
200 CAPSULE ORAL 3 TIMES DAILY PRN
Qty: 30 CAPSULE | Refills: 0 | Status: SHIPPED | OUTPATIENT
Start: 2025-05-09 | End: 2025-05-19

## 2025-05-09 NOTE — PROGRESS NOTES
Chief Complaint:   Sore Throat, Other (Runny nose), and Cough      History of Present Illness   Source of history provided by:  patient.     Berkley Garcia is a 35 y.o. old female who presents to primary care for evaluation of sinus pressure, nasal congestion, discolored nasal drainage, bilateral ear pressure, mild non-productive productive cough and sore throat x 7  Days. Has been taking  OTC For a combination of cold/flu symptoms: multi-symptom cold/flu products, such as Dayquil, Nyquil, Theraflu and Vika-[place] Plus   Zyrtec,   without relief. Denies any fever, chills, wheezing, CP, SOB, or GI symptoms.  denies any hx of asthma, COPD, denies  tobacco use.  History of Present Illness  The patient presents for evaluation of a sore throat.    Seen 1 week ago for sore throat and complete loss of voice. Voice improved, but sore throat persists. Reports rhinorrhea with green nasal discharge, non-productive nocturnal cough, and nasal discharge upon bending over. Managing symptoms with Zyrtec 4 mg and OTC Vika-[place] Severe Allergy Sinus Relief. Prescribed Medrol Dosepak last visit, no antibiotics. Previously responded well to Augmentin without adverse reactions. Bowel movements normal.  Review of Systems   Unless otherwise stated in this report or unable to obtain because of the patient's clinical or mental status as evidenced by the medical record, this patients's positive and negative responses for Review of Systems, constitutional, psych, eyes, ENT, cardiovascular, respiratory, gastrointestinal, neurological, genitourinary, musculoskeletal, integument systems and systems related to the presenting problem are either stated in the preceding or were negative for the symptoms and/or complaints related to the medical problem.    Past Medical History:  has a past medical history of Migraine with aura and without status migrainosus, not intractable.   Past Surgical History:  has no past surgical history on

## 2025-07-02 ENCOUNTER — OFFICE VISIT (OUTPATIENT)
Dept: PRIMARY CARE CLINIC | Age: 35
End: 2025-07-02
Payer: COMMERCIAL

## 2025-07-02 VITALS
BODY MASS INDEX: 25.76 KG/M2 | SYSTOLIC BLOOD PRESSURE: 122 MMHG | RESPIRATION RATE: 16 BRPM | HEIGHT: 62 IN | HEART RATE: 68 BPM | DIASTOLIC BLOOD PRESSURE: 70 MMHG | WEIGHT: 140 LBS | OXYGEN SATURATION: 99 %

## 2025-07-02 DIAGNOSIS — Z01.818 PREOPERATIVE EXAMINATION: ICD-10-CM

## 2025-07-02 DIAGNOSIS — N64.89 SMALL BREAST: ICD-10-CM

## 2025-07-02 DIAGNOSIS — Z01.818 PREOPERATIVE EXAMINATION: Primary | ICD-10-CM

## 2025-07-02 LAB
ANION GAP SERPL CALCULATED.3IONS-SCNC: 9 MMOL/L (ref 7–16)
BUN BLDV-MCNC: 10 MG/DL (ref 6–20)
CALCIUM SERPL-MCNC: 9 MG/DL (ref 8.6–10)
CHLORIDE BLD-SCNC: 106 MMOL/L (ref 98–107)
CO2: 24 MMOL/L (ref 22–29)
CREAT SERPL-MCNC: 0.7 MG/DL (ref 0.5–1)
GFR, ESTIMATED: >90 ML/MIN/1.73M2
GLUCOSE BLD-MCNC: 84 MG/DL (ref 74–99)
HBA1C MFR BLD: 4.8 % (ref 4–5.6)
HCT VFR BLD CALC: 37.9 % (ref 34–48)
HEMOGLOBIN: 12.4 G/DL (ref 11.5–15.5)
MCH RBC QN AUTO: 33.2 PG (ref 26–35)
MCHC RBC AUTO-ENTMCNC: 32.7 G/DL (ref 32–34.5)
MCV RBC AUTO: 101.3 FL (ref 80–99.9)
PDW BLD-RTO: 11.7 % (ref 11.5–15)
PLATELET # BLD: 198 K/UL (ref 130–450)
PMV BLD AUTO: 11.8 FL (ref 7–12)
POTASSIUM SERPL-SCNC: 4 MMOL/L (ref 3.5–5.1)
RBC # BLD: 3.74 M/UL (ref 3.5–5.5)
SODIUM BLD-SCNC: 139 MMOL/L (ref 136–145)
TSH SERPL DL<=0.05 MIU/L-ACNC: 1.02 UIU/ML (ref 0.27–4.2)
WBC # BLD: 4.3 K/UL (ref 4.5–11.5)

## 2025-07-02 PROCEDURE — G8427 DOCREV CUR MEDS BY ELIG CLIN: HCPCS | Performed by: FAMILY MEDICINE

## 2025-07-02 PROCEDURE — G8419 CALC BMI OUT NRM PARAM NOF/U: HCPCS | Performed by: FAMILY MEDICINE

## 2025-07-02 PROCEDURE — 1036F TOBACCO NON-USER: CPT | Performed by: FAMILY MEDICINE

## 2025-07-02 PROCEDURE — 99213 OFFICE O/P EST LOW 20 MIN: CPT | Performed by: FAMILY MEDICINE

## 2025-07-02 ASSESSMENT — ENCOUNTER SYMPTOMS
SORE THROAT: 0
SHORTNESS OF BREATH: 0
CHEST TIGHTNESS: 0
COUGH: 0
GASTROINTESTINAL NEGATIVE: 1
WHEEZING: 0

## 2025-07-02 NOTE — PROGRESS NOTES
25  Berkley Garcia : 1990 Sex: female  Age: 35 y.o.      Assessment and Plan:  Berkley was seen today for pre-op exam.    Diagnoses and all orders for this visit:    Preoperative examination  -     CBC; Future  -     Hemoglobin A1C; Future  -     Nicotine, Blood; Future  -     Basic Metabolic Panel; Future  -     TSH; Future    Small breast        Patient is quite healthy, with past medical history only significant for migraines that have been fairly well-controlled.  She is independent and able to achieve 4 METS of physical activity.  Pending reassuring results of the above blood work, she is considered acceptable risk to proceed as scheduled; she is low risk for a low risk procedure.    Patient is able to continue on her Cymbalta up until the day prior to surgery.  She should discontinue all NSAIDs and over-the-counter supplements one week prior to surgery unless otherwise instructed by her operating physician.      Return if symptoms worsen or fail to improve.      Chief Complaint   Patient presents with    Pre-op Exam       HPI  Pt here for preoperative examination  She will be undergoing a bilateral breast augmentation with silicone gel implants   Surgery will be done with Dr. Alejandro Mosley through [facility]   Scheduled for     Patient today is doing well and has no acute complaints  She is completely independent, and able to achieve 4 METS physical activity without concerning symptoms    PMHx reviewed and updated in chart as necessary  Med list reviewed and updated in chart as necessary  NKDA   No personal hx of any problems with anesthesia in the past     Problem list reviewed and updated in full with patient today as necessary.  A comprehensive ROS was negative, except as documented above.   Review of Systems   Constitutional:  Negative for chills and fever.   HENT:  Negative for sore throat.    Respiratory:  Negative for cough, chest tightness, shortness of breath

## 2025-07-03 ENCOUNTER — RESULTS FOLLOW-UP (OUTPATIENT)
Dept: PRIMARY CARE CLINIC | Age: 35
End: 2025-07-03

## 2025-07-06 LAB
COTININE: <5 NG/ML
NICOTINE: <5 NG/ML

## 2025-08-11 DIAGNOSIS — G43.109 MIGRAINE WITH AURA AND WITHOUT STATUS MIGRAINOSUS, NOT INTRACTABLE: ICD-10-CM

## 2025-08-12 RX ORDER — SUMATRIPTAN SUCCINATE 100 MG/1
TABLET ORAL
Qty: 12 TABLET | Refills: 5 | Status: SHIPPED | OUTPATIENT
Start: 2025-08-12